# Patient Record
Sex: FEMALE | Race: WHITE | NOT HISPANIC OR LATINO | Employment: FULL TIME | URBAN - METROPOLITAN AREA
[De-identification: names, ages, dates, MRNs, and addresses within clinical notes are randomized per-mention and may not be internally consistent; named-entity substitution may affect disease eponyms.]

---

## 2019-12-19 ENCOUNTER — COMMUNICATION - HEALTHEAST (OUTPATIENT)
Dept: FAMILY MEDICINE | Facility: CLINIC | Age: 34
End: 2019-12-19

## 2021-01-06 ENCOUNTER — OFFICE VISIT - HEALTHEAST (OUTPATIENT)
Dept: FAMILY MEDICINE | Facility: CLINIC | Age: 36
End: 2021-01-06

## 2021-01-06 DIAGNOSIS — Z23 NEED FOR IMMUNIZATION AGAINST INFLUENZA: ICD-10-CM

## 2021-01-06 DIAGNOSIS — Z23 NEED FOR TETANUS BOOSTER: ICD-10-CM

## 2021-01-06 DIAGNOSIS — Z87.42 H/O ABNORMAL CERVICAL PAPANICOLAOU SMEAR: ICD-10-CM

## 2021-01-06 DIAGNOSIS — K90.41 GLUTEN INTOLERANCE: ICD-10-CM

## 2021-01-06 DIAGNOSIS — Z11.4 SCREENING FOR HIV (HUMAN IMMUNODEFICIENCY VIRUS): ICD-10-CM

## 2021-01-06 DIAGNOSIS — Z11.59 ENCOUNTER FOR HEPATITIS C SCREENING TEST FOR LOW RISK PATIENT: ICD-10-CM

## 2021-01-06 DIAGNOSIS — F41.1 GENERALIZED ANXIETY DISORDER: ICD-10-CM

## 2021-01-06 DIAGNOSIS — Z11.59 NEED FOR HEPATITIS B SCREENING TEST: ICD-10-CM

## 2021-01-06 DIAGNOSIS — Z00.00 ROUTINE GENERAL MEDICAL EXAMINATION AT A HEALTH CARE FACILITY: ICD-10-CM

## 2021-01-06 LAB
ALBUMIN SERPL-MCNC: 4.3 G/DL (ref 3.5–5)
ALBUMIN UR-MCNC: NEGATIVE MG/DL
ALP SERPL-CCNC: 51 U/L (ref 45–120)
ALT SERPL W P-5'-P-CCNC: 10 U/L (ref 0–45)
ANION GAP SERPL CALCULATED.3IONS-SCNC: 11 MMOL/L (ref 5–18)
APPEARANCE UR: CLEAR
AST SERPL W P-5'-P-CCNC: 16 U/L (ref 0–40)
BACTERIA #/AREA URNS HPF: ABNORMAL HPF
BILIRUB SERPL-MCNC: 0.5 MG/DL (ref 0–1)
BILIRUB UR QL STRIP: NEGATIVE
BUN SERPL-MCNC: 9 MG/DL (ref 8–22)
CALCIUM SERPL-MCNC: 9.3 MG/DL (ref 8.5–10.5)
CHLORIDE BLD-SCNC: 105 MMOL/L (ref 98–107)
CHOLEST SERPL-MCNC: 199 MG/DL
CLUE CELLS: NORMAL
CO2 SERPL-SCNC: 23 MMOL/L (ref 22–31)
COLOR UR AUTO: YELLOW
CREAT SERPL-MCNC: 0.84 MG/DL (ref 0.6–1.1)
ERYTHROCYTE [DISTWIDTH] IN BLOOD BY AUTOMATED COUNT: 11.1 % (ref 11–14.5)
FASTING STATUS PATIENT QL REPORTED: YES
GFR SERPL CREATININE-BSD FRML MDRD: >60 ML/MIN/1.73M2
GLUCOSE BLD-MCNC: 83 MG/DL (ref 70–125)
GLUCOSE UR STRIP-MCNC: NEGATIVE MG/DL
HCT VFR BLD AUTO: 42.8 % (ref 35–47)
HDLC SERPL-MCNC: 79 MG/DL
HGB BLD-MCNC: 14 G/DL (ref 12–16)
HGB UR QL STRIP: ABNORMAL
HIV 1+2 AB+HIV1 P24 AG SERPL QL IA: NEGATIVE
KETONES UR STRIP-MCNC: NEGATIVE MG/DL
LDLC SERPL CALC-MCNC: 110 MG/DL
LEUKOCYTE ESTERASE UR QL STRIP: NEGATIVE
MCH RBC QN AUTO: 30.2 PG (ref 27–34)
MCHC RBC AUTO-ENTMCNC: 32.7 G/DL (ref 32–36)
MCV RBC AUTO: 92 FL (ref 80–100)
NITRATE UR QL: NEGATIVE
PH UR STRIP: 6 [PH] (ref 5–8)
PLATELET # BLD AUTO: 216 THOU/UL (ref 140–440)
PMV BLD AUTO: 8.1 FL (ref 7–10)
POTASSIUM BLD-SCNC: 4.7 MMOL/L (ref 3.5–5)
PROT SERPL-MCNC: 7.3 G/DL (ref 6–8)
RBC # BLD AUTO: 4.63 MILL/UL (ref 3.8–5.4)
RBC #/AREA URNS AUTO: ABNORMAL HPF
SODIUM SERPL-SCNC: 139 MMOL/L (ref 136–145)
SP GR UR STRIP: 1.02 (ref 1–1.03)
SQUAMOUS #/AREA URNS AUTO: ABNORMAL LPF
TRICHOMONAS, WET PREP: NORMAL
TRIGL SERPL-MCNC: 48 MG/DL
UROBILINOGEN UR STRIP-ACNC: ABNORMAL
WBC #/AREA URNS AUTO: ABNORMAL HPF
WBC: 4.8 THOU/UL (ref 4–11)
YEAST, WET PREP: NORMAL

## 2021-01-06 ASSESSMENT — MIFFLIN-ST. JEOR: SCORE: 1318.19

## 2021-01-07 LAB
HBV SURFACE AB SERPL IA-ACNC: POSITIVE M[IU]/ML
HCV AB SERPL QL IA: NEGATIVE
HPV SOURCE: NORMAL
HUMAN PAPILLOMA VIRUS 16 DNA: NEGATIVE
HUMAN PAPILLOMA VIRUS 18 DNA: NEGATIVE
HUMAN PAPILLOMA VIRUS FINAL DIAGNOSIS: NORMAL
HUMAN PAPILLOMA VIRUS OTHER HR: NEGATIVE
SPECIMEN DESCRIPTION: NORMAL

## 2021-01-08 ENCOUNTER — COMMUNICATION - HEALTHEAST (OUTPATIENT)
Dept: FAMILY MEDICINE | Facility: CLINIC | Age: 36
End: 2021-01-08

## 2021-01-13 ENCOUNTER — OFFICE VISIT - HEALTHEAST (OUTPATIENT)
Dept: PHYSICAL THERAPY | Facility: REHABILITATION | Age: 36
End: 2021-01-13

## 2021-01-13 DIAGNOSIS — M43.6 NECK STIFFNESS: ICD-10-CM

## 2021-01-13 DIAGNOSIS — G89.29 CHRONIC BILATERAL THORACIC BACK PAIN: ICD-10-CM

## 2021-01-13 DIAGNOSIS — M26.623 BILATERAL TEMPOROMANDIBULAR JOINT PAIN-DYSFUNCTION SYNDROME: ICD-10-CM

## 2021-01-13 DIAGNOSIS — M54.2 CHRONIC NECK PAIN: ICD-10-CM

## 2021-01-13 DIAGNOSIS — M54.6 CHRONIC BILATERAL THORACIC BACK PAIN: ICD-10-CM

## 2021-01-13 DIAGNOSIS — G89.29 CHRONIC NECK PAIN: ICD-10-CM

## 2021-01-16 ENCOUNTER — RECORDS - HEALTHEAST (OUTPATIENT)
Dept: ADMINISTRATIVE | Facility: OTHER | Age: 36
End: 2021-01-16

## 2021-01-18 ENCOUNTER — COMMUNICATION - HEALTHEAST (OUTPATIENT)
Dept: FAMILY MEDICINE | Facility: CLINIC | Age: 36
End: 2021-01-18

## 2021-01-18 DIAGNOSIS — J02.0 STREP PHARYNGITIS: ICD-10-CM

## 2021-01-27 ENCOUNTER — OFFICE VISIT - HEALTHEAST (OUTPATIENT)
Dept: PHYSICAL THERAPY | Facility: REHABILITATION | Age: 36
End: 2021-01-27

## 2021-01-27 DIAGNOSIS — M43.6 NECK STIFFNESS: ICD-10-CM

## 2021-01-27 DIAGNOSIS — G89.29 CHRONIC NECK PAIN: ICD-10-CM

## 2021-01-27 DIAGNOSIS — G89.29 CHRONIC BILATERAL THORACIC BACK PAIN: ICD-10-CM

## 2021-01-27 DIAGNOSIS — M54.2 CHRONIC NECK PAIN: ICD-10-CM

## 2021-01-27 DIAGNOSIS — M54.6 CHRONIC BILATERAL THORACIC BACK PAIN: ICD-10-CM

## 2021-01-27 DIAGNOSIS — M26.623 BILATERAL TEMPOROMANDIBULAR JOINT PAIN-DYSFUNCTION SYNDROME: ICD-10-CM

## 2021-02-02 ENCOUNTER — COMMUNICATION - HEALTHEAST (OUTPATIENT)
Dept: FAMILY MEDICINE | Facility: CLINIC | Age: 36
End: 2021-02-02

## 2021-02-03 ENCOUNTER — OFFICE VISIT - HEALTHEAST (OUTPATIENT)
Dept: PHYSICAL THERAPY | Facility: REHABILITATION | Age: 36
End: 2021-02-03

## 2021-02-03 DIAGNOSIS — M54.2 CHRONIC NECK PAIN: ICD-10-CM

## 2021-02-03 DIAGNOSIS — M43.6 NECK STIFFNESS: ICD-10-CM

## 2021-02-03 DIAGNOSIS — G89.29 CHRONIC BILATERAL THORACIC BACK PAIN: ICD-10-CM

## 2021-02-03 DIAGNOSIS — M26.623 BILATERAL TEMPOROMANDIBULAR JOINT PAIN-DYSFUNCTION SYNDROME: ICD-10-CM

## 2021-02-03 DIAGNOSIS — G89.29 CHRONIC NECK PAIN: ICD-10-CM

## 2021-02-03 DIAGNOSIS — M54.6 CHRONIC BILATERAL THORACIC BACK PAIN: ICD-10-CM

## 2021-02-08 ENCOUNTER — OFFICE VISIT - HEALTHEAST (OUTPATIENT)
Dept: PHYSICAL THERAPY | Facility: REHABILITATION | Age: 36
End: 2021-02-08

## 2021-02-08 DIAGNOSIS — M54.2 CHRONIC NECK PAIN: ICD-10-CM

## 2021-02-08 DIAGNOSIS — G89.29 CHRONIC NECK PAIN: ICD-10-CM

## 2021-02-08 DIAGNOSIS — M43.6 NECK STIFFNESS: ICD-10-CM

## 2021-02-08 DIAGNOSIS — G89.29 CHRONIC BILATERAL THORACIC BACK PAIN: ICD-10-CM

## 2021-02-08 DIAGNOSIS — M54.6 CHRONIC BILATERAL THORACIC BACK PAIN: ICD-10-CM

## 2021-02-08 DIAGNOSIS — M26.623 BILATERAL TEMPOROMANDIBULAR JOINT PAIN-DYSFUNCTION SYNDROME: ICD-10-CM

## 2021-02-16 ENCOUNTER — OFFICE VISIT - HEALTHEAST (OUTPATIENT)
Dept: PHYSICAL THERAPY | Facility: REHABILITATION | Age: 36
End: 2021-02-16

## 2021-02-16 DIAGNOSIS — M26.623 BILATERAL TEMPOROMANDIBULAR JOINT PAIN-DYSFUNCTION SYNDROME: ICD-10-CM

## 2021-02-16 DIAGNOSIS — M43.6 NECK STIFFNESS: ICD-10-CM

## 2021-02-16 DIAGNOSIS — M54.2 CHRONIC NECK PAIN: ICD-10-CM

## 2021-02-16 DIAGNOSIS — G89.29 CHRONIC BILATERAL THORACIC BACK PAIN: ICD-10-CM

## 2021-02-16 DIAGNOSIS — M54.6 CHRONIC BILATERAL THORACIC BACK PAIN: ICD-10-CM

## 2021-02-16 DIAGNOSIS — G89.29 CHRONIC NECK PAIN: ICD-10-CM

## 2021-04-13 ENCOUNTER — OFFICE VISIT - HEALTHEAST (OUTPATIENT)
Dept: PHYSICAL THERAPY | Facility: REHABILITATION | Age: 36
End: 2021-04-13

## 2021-04-13 DIAGNOSIS — M43.6 NECK STIFFNESS: ICD-10-CM

## 2021-04-13 DIAGNOSIS — M26.623 BILATERAL TEMPOROMANDIBULAR JOINT PAIN-DYSFUNCTION SYNDROME: ICD-10-CM

## 2021-04-13 DIAGNOSIS — M54.6 CHRONIC BILATERAL THORACIC BACK PAIN: ICD-10-CM

## 2021-04-13 DIAGNOSIS — M54.2 CHRONIC NECK PAIN: ICD-10-CM

## 2021-04-13 DIAGNOSIS — G89.29 CHRONIC NECK PAIN: ICD-10-CM

## 2021-04-13 DIAGNOSIS — G89.29 CHRONIC BILATERAL THORACIC BACK PAIN: ICD-10-CM

## 2021-04-20 ENCOUNTER — OFFICE VISIT - HEALTHEAST (OUTPATIENT)
Dept: PHYSICAL THERAPY | Facility: REHABILITATION | Age: 36
End: 2021-04-20

## 2021-04-20 DIAGNOSIS — M26.623 BILATERAL TEMPOROMANDIBULAR JOINT PAIN-DYSFUNCTION SYNDROME: ICD-10-CM

## 2021-04-20 DIAGNOSIS — M54.2 CHRONIC NECK PAIN: ICD-10-CM

## 2021-04-20 DIAGNOSIS — G89.29 CHRONIC NECK PAIN: ICD-10-CM

## 2021-04-20 DIAGNOSIS — G89.29 CHRONIC BILATERAL THORACIC BACK PAIN: ICD-10-CM

## 2021-04-20 DIAGNOSIS — M54.6 CHRONIC BILATERAL THORACIC BACK PAIN: ICD-10-CM

## 2021-04-20 DIAGNOSIS — M43.6 NECK STIFFNESS: ICD-10-CM

## 2021-05-04 ENCOUNTER — OFFICE VISIT - HEALTHEAST (OUTPATIENT)
Dept: PHYSICAL THERAPY | Facility: REHABILITATION | Age: 36
End: 2021-05-04

## 2021-05-04 DIAGNOSIS — M43.6 NECK STIFFNESS: ICD-10-CM

## 2021-05-04 DIAGNOSIS — G89.29 CHRONIC NECK PAIN: ICD-10-CM

## 2021-05-04 DIAGNOSIS — M26.623 BILATERAL TEMPOROMANDIBULAR JOINT PAIN-DYSFUNCTION SYNDROME: ICD-10-CM

## 2021-05-04 DIAGNOSIS — G89.29 CHRONIC BILATERAL THORACIC BACK PAIN: ICD-10-CM

## 2021-05-04 DIAGNOSIS — M54.2 CHRONIC NECK PAIN: ICD-10-CM

## 2021-05-04 DIAGNOSIS — M54.6 CHRONIC BILATERAL THORACIC BACK PAIN: ICD-10-CM

## 2021-05-19 ENCOUNTER — OFFICE VISIT - HEALTHEAST (OUTPATIENT)
Dept: PHYSICAL THERAPY | Facility: REHABILITATION | Age: 36
End: 2021-05-19

## 2021-05-19 DIAGNOSIS — M54.6 CHRONIC BILATERAL THORACIC BACK PAIN: ICD-10-CM

## 2021-05-19 DIAGNOSIS — G89.29 CHRONIC NECK PAIN: ICD-10-CM

## 2021-05-19 DIAGNOSIS — M26.623 BILATERAL TEMPOROMANDIBULAR JOINT PAIN-DYSFUNCTION SYNDROME: ICD-10-CM

## 2021-05-19 DIAGNOSIS — G89.29 CHRONIC BILATERAL THORACIC BACK PAIN: ICD-10-CM

## 2021-05-19 DIAGNOSIS — M43.6 NECK STIFFNESS: ICD-10-CM

## 2021-05-19 DIAGNOSIS — M54.2 CHRONIC NECK PAIN: ICD-10-CM

## 2021-06-04 NOTE — TELEPHONE ENCOUNTER
Who is calling:  Patient, Irina Marino   Reason for Call:  Has until Monday to make decision on healthcare and very interested in having Dr. Morley as PCP based on [facility] experience, would like to be contacted is Dr. Morley would be willing to take her on as new patient   Date of last appointment with primary care:   Okay to leave a detailed message: Yes

## 2021-06-05 VITALS
BODY MASS INDEX: 20.99 KG/M2 | HEART RATE: 86 BPM | DIASTOLIC BLOOD PRESSURE: 66 MMHG | TEMPERATURE: 98.7 F | HEIGHT: 67 IN | SYSTOLIC BLOOD PRESSURE: 100 MMHG | OXYGEN SATURATION: 99 % | WEIGHT: 133.75 LBS | RESPIRATION RATE: 18 BRPM

## 2021-06-14 NOTE — TELEPHONE ENCOUNTER
Based [facility], I recommend empiric treatment of strep pharyngitis. Her other option would be to go to another urgent care to have a confirmation done.       Based on her symptoms, she meets 3/4 centor criteria (tonsilar exudates, fever, no cough).       Please call patient to let her know her options.   I can send in amoxicillin 500mg two times a day for 10 days. Per chart review, she does not have any allergies to penicillins.     Maddy Sevilla MD

## 2021-06-14 NOTE — PROGRESS NOTES
[facility] Daily Progress     Patient Name: Irina Marino  Date: 1/27/2021  Visit #: 2/6-12  PTA visit #:    Referral Diagnosis:  Bilateral TMJ arthralgia, masticatory and cervical myofascial pain  Referring provider: Deborah Pope DDS, MS  Visit Diagnosis:     ICD-10-CM    1. Bilateral temporomandibular joint pain-dysfunction syndrome  M26.623    2. Chronic neck pain  M54.2     G89.29    3. Neck stiffness  M43.6    4. Chronic bilateral thoracic back pain  M54.6     G89.29      Assessment:   From Evaluation: Pt presents to PT with chronic bilateral TMJ, neck, upper back/shoulder pain with mobility deficits.  MT and HEP initiated today, and patient with good tolerance for all. Plan to continue to address cervical/thoracic hypomobility and myofascial restrictions to help reduce pain and improve QOL.     Patient demonstrates understanding/independence with home program.  Patient is benefitting from skilled physical therapy and is making steady progress toward functional goals.  Patient is appropriate to continue with skilled physical therapy intervention, as indicated by initial plan of care.    Goal Status:  Pt. will be independent with home exercise program in : 6 weeks;Comment  Comment:: for pain <= 1/10  Pt. will decrease use of medication for pain for improved quality of life in : 6 weeks    No data recorded    Plan / Patient Education:     Continue with initial plan of care.  Progress with home program as tolerated.     Plan for next session: Review HEP for correct performance. Continue MT to neck/upper back/TMJ to address spinal stiffness and myofascial restriction impairments.    Subjective:    TMJ heating pad came but the gel packs are not working well -has to come up with something else. Mouth guard: going tomorrow for impressions.   Since last session - pt had strep throat.  Has not been as compliant with HEP due to illness.   Pain Rating: not painful - more tight    Dull pain  tightness in TMJ, neck and upper trap.     Objective:   Exercises:  Exercise #1: Thoracic mobilty ex: foam rolling vertically x1 min, segmental extension x20 reps  Comment #1: Prone Is: 10x3 sec  Exercise #2: Chin tuck: 15x3 sec supine  Comment #2: L/R scalene stretch: 2x20-30 sec B  Discussed Prone I's - pt can perform rows instead with dumbbells on days she is lifting weights   Discussed chin tucks as a way to improve posture with sitting at computer  Discussed posture at computer - pt has added a lumbar roll.     Treatment Today     TREATMENT MINUTES COMMENTS   Evaluation     Self-care/ Home management     Manual therapy 23 Supine:   CST TMJ release.   MFR to masseter  Occipital release   Dural tube traction - cervical   UT STM     Neuromuscular Re-education     Therapeutic Activity     Therapeutic Exercises 3 See above    Gait training     Modality__________________                Total 26    Blank areas are intentional and mean the treatment did not include these items.       Ingrid Curtis, PT, DPT  1/27/2021

## 2021-06-14 NOTE — TELEPHONE ENCOUNTER
Copy of [facility] medical record received. Copied to medical record scanning and copy given to Dr. Sevilla who is covering for Dr. Danielson today.

## 2021-06-14 NOTE — TELEPHONE ENCOUNTER
"Reason for call:  Patient reporting a symptom    Symptom or request: Mild light-headedness, strange sensation in hands    Duration (how long have symptoms been present): Started this morning     Have you been treated for this before? N/A-follow up question, poss SE of Amoxicillin?     Additional comments: The patient contacts the office today follow up on what she things might be a reaction to the Amoxicillin prescribed by Dr. Sevilla 1/18/2021.     The patient states that she took her first med dose yesterday 1/18/2021 and second dose this morning. She took the medicine with food (toast and 2 eggs, water and 2 cups herbal tea). Patient states she was sitting at desk at home typing and became slightly light-headed and developed a strange sensation in her hands \"it's like I can't exert as much force on a key with finger, like I have decreased control of my hands.\" Patient endorses lessening of the above now but still present to some degree.     No other s/s, denies N/V.     Patient wants to know MD opinion and okay for patient to continue taking?     Phone Number patient can be reached at:  Cell number on file:    Telephone Information:   Mobile [phone]       Best Time:  ASAP     Can we leave a detailed message on this number: Yes    Call taken on 1/19/2021 at 12:40 PM by Robbie Henson    "

## 2021-06-14 NOTE — PROGRESS NOTES
[facility]   Initial Evaluation    Patient Name: Irina Marino  Date of evaluation: 1/13/2021  PRECAUTIONS: none  Referral Diagnosis: Bilateral TMJ arthralgia, masticatory and cervical myofascial pain  Referring provider: Deborah Pope DDS, MS  Visit Diagnosis:     ICD-10-CM    1. Bilateral temporomandibular joint pain-dysfunction syndrome  M26.623    2. Chronic neck pain  M54.2     G89.29    3. Neck stiffness  M43.6    4. Chronic bilateral thoracic back pain  M54.6     G89.29        Assessment:      Pt. is appropriate for skilled PT intervention as outlined in the Plan of Care (POC).  Pt. is a good candidate for skilled PT services to improve pain levels and function.  Goals and POC established in collaboration with the patient.  Pt presents to PT with chronic bilateral TMJ, neck, upper back/shoulder pain with mobility deficits.  MT and HEP initiated today, and patient with good tolerance for all. Plan to continue to address cervical/thoracic hypomobility and myofascial restrictions to help reduce pain and improve QOL.    Goals:  Pt. will be independent with home exercise program in : 6 weeks;Comment  Comment:: for pain <= 1/10  Pt. will decrease use of medication for pain for improved quality of life in : 6 weeks    Patient's expectations/goals are realistic.    Barriers to Learning or Achieving Goals:  Chronicity of the problem.       Plan / Patient Instructions:        Plan of Care:   Communication with: Referral Source;Patient Caregiver  Patient Related Instruction: Nature of Condition;Treatment plan and rationale;Self Care instruction;Basis of treatment;Posture;Precautions;Next steps;Expected outcome  Times per Week: 1  Number of Weeks: 6-12  Number of Visits: 6-12  Therapeutic Exercise: Stretching;Strengthening  Neuromuscular Reeducation: posture  Manual Therapy: soft tissue mobilization;myofascial release;joint mobilization;craniosacral therapy;muscle  "energy      Plan for next visit: Review HEP for correct performance. Continue MT to neck/upper back/TMJ to address spinal stiffness and myofascial restriction impairments.     Subjective:       History of Present Illness:    Irina is a 35 y.o. female who presents to therapy today with complaints of chronic bilateral TMJ, neck, shoulders, and upper back pain.   Onset: Chronic for many years.   [facility] Currently has a night mouth guard, but will be getting a new one made to help with the clenching at nighttime, in addition to referral for PT, as well as buying a TMJ heating pad off [employer].  Occcasionally will note aching into the ears.  Often finds herself clenching into forward flexed position t/o her upper quarter.  Does get tension HAs, described as frequent.  While does have h/o anxiety and previous medication use, is not currently on any medications. Working to manage through various techniques including exercise (Peloton 5 days/week and yoga), diet, relaxing baths while listening to a podcast.  Duration: Constant  Pain Medication: Denies pain medication use.  Sleep: Reports waking 2x/night on average due to pain.    Denies previous neck surgeries.  [place] teeth have been removed.  Notes she did have braces a couple of times growing up, as she did move several times.    Pt seeks PT to \"help with pain, tightness, clenching due to TMJ pain in shoulders and neck.\"    Pain Rating: Did not rate     Objective:      Patient Outcome Measures :    None completed    Examination  1. Bilateral temporomandibular joint pain-dysfunction syndrome     2. Chronic neck pain     3. Neck stiffness     4. Chronic bilateral thoracic back pain       Involved side: Bilateral  Posture Observation:      General sitting posture is  normal.  General standing posture is normal.    Cervical ROM  Date:      *Indicate scale AROM AROM AROM   Cervical Flexion 50 with ERP in mid back     Cervical Extension 45      " Right Left Right Left Right Left   Cervical Sidebending 30 with pull on L 30 with pull on R       Cervical Rotation 65 with pull on R 70 with pull on R       Cervical Protraction      Cervical Retraction      Thoracic Flexion      Thoracic Extension      Thoracic Sidebending         Thoracic Rotation           Bilat shoulder AROM WFL t/o.  Strength  Date:      Cervical Myotomes/5 Right Left Right Left Right Left   Cervical Flexion (C1-2)         Cervical Sidebending (C3)         Shoulder Elevation (C4)         Shoulder Abduction (C5)         Elbow Flexion (C6)         Elbow Extension (C7)         Wrist Flexion (C7)         Wrist Extension (C6)         Thumb abduction (C8)         Finger Abduction (T1)           MMT rhomboids 4+/5 bilat  MMT middle trap 4/5 bilat  MMT lower trap 4-/5 bilat    TMJ ROM  Motion(normal values) mm Pain Comment (noise/ deviation)   Active Incisal opening (40-60 mm)   WNL AROM, but notes mild end-range discomfort bilat. Slight R lateral deviation, incoordination noted on opening   Right laterotrusion (7-12mm)   WNL   Left laterotrusion(7-12 mm)   WNL   Protrusion (8-12mm)   WNL     Palpation and PIVM: Increased muscle spasm noted bilat cervical paraspinals, scalenes, masseter, suboccipitals. Hypomobility present with central PAs to cervical and upper/mid thoracic spine (none further assessed).    Treatment Today     TREATMENT MINUTES COMMENTS   Evaluation 30 Discussed therapy diagnosis, prognosis, POC, relevant anatomy and basis for tx.   Self-care/ Home management     Manual therapy 10 Prone central PAs grade II to C2-C7 and T1-T5  Supine SOR, STM to bilat masseter and scalenes   Neuromuscular Re-education     Therapeutic Activity     Therapeutic Exercises 25 Exercises:  Exercise #1: Thoracic mobilty ex: foam rolling vertically x1 min, segmental extension x20 reps  Comment #1: Prone Is: 10x3 sec  Exercise #2: Chin tuck: 15x3 sec supine  Comment #2: L/R scalene stretch: 2x20-30 sec B      Gait training     Modality__________________                Total 65    Blank areas are intentional and mean the treatment did not include these items.            PT Evaluation Code: (Please list factors)  Patient History/Comorbidities: chronicity  Examination: B TMJ/neck/upper back pain  Clinical Presentation: Stable  Clinical Decision Making: Low    Patient History/  Comorbidities Examination  (body structures and functions, activity limitations, and/or participation restrictions) Clinical Presentation Clinical Decision Making (Complexity)   No documented Comorbidities or personal factors 1-2 Elements Stable and/or uncomplicated Low   1-2 documented comorbidities or personal factor 3 Elements Evolving clinical presentation with changing characteristics Moderate   3-4 documented comorbidities or personal factors 4 or more Unstable and unpredictable High Abhinav Mckeon  1/13/2021  4:26 PM

## 2021-06-14 NOTE — TELEPHONE ENCOUNTER
Writer called and left detailed message for patient per her request. If she calls back, please advise her per MD note below.

## 2021-06-14 NOTE — TELEPHONE ENCOUNTER
Only other option would be a penicillin injection.     Medication sent.   If patient prefers injection, we could do a nurse only visit.   She would be considered no longer contagious 24 hours after she starts treatment.     Maddy Sevilla MD

## 2021-06-14 NOTE — TELEPHONE ENCOUNTER
"Reason for call:  Patient reporting a symptom    Symptom or request: The patient states that she developed sore throat with white spots, pinpoint red spots on soft palate, 99 F temp, body aches, pounding headache, and fatigue on 1/14/2021. The patient states that her sore feels \"full\" and it hurts to swallow. Patient was evaluated @ [facility] in [facility], provider completed rapid strep test but no confirmatory test.     Patient had a COVID test completed at [facility] in [place] when she found out her rapid strep was negative. COVID test negative/normal. Patient denies rash, shortness of breath, loss of smell/taste. Patient has been using ES Tylenol for symptoms with minimal relief.    Duration (how long have symptoms been present): >5 days, started 01/14/2021    Have you been treated for this before? Yes    Additional comments: The patient states that she needs to know what her next steps are in light of the fact that the [facility] provider did not do a confirmatory test?     Symptoms remain unresolved.     Phone Number patient can be reached at:  Cell number on file:    Telephone Information:   Mobile [phone]       Best Time:  ASAP     Can we leave a detailed message on this number: Yes    Call taken on 1/18/2021 at 2:20 PM by Robbie Henson    "

## 2021-06-14 NOTE — TELEPHONE ENCOUNTER
Patient notified. The patient verbalizes understanding of provider/CSS instructions for follow-up and continued care per provider message.     No allergies to PCN. The patient is wondering if there is a formulation that one can take that does not take 10 days?     [facility], typically collects medications at [facility] but not today.

## 2021-06-14 NOTE — TELEPHONE ENCOUNTER
Does not sound like allergic reaction.   I think it would be safe to continue on amoxicillin.     However, if symptoms continue to get worse we can change to azithromycin.     Maddy Sevilla MD

## 2021-06-15 NOTE — PROGRESS NOTES
[facility] Daily Progress     Patient Name: Irina Marino  Date: 2/16/2021  Visit #: 5/6-12  PTA visit #:    Referral Diagnosis:  Bilateral TMJ arthralgia, masticatory and cervical myofascial pain  Referring provider: Deborah Pope DDS, MS  Visit Diagnosis:     ICD-10-CM    1. Bilateral temporomandibular joint pain-dysfunction syndrome  M26.623    2. Chronic neck pain  M54.2     G89.29    3. Neck stiffness  M43.6    4. Chronic bilateral thoracic back pain  M54.6     G89.29      Assessment:     Pt with excellent understanding of HEP/self-care techniques, working to incorporate throughout the day for optimal sx relief.  She is still waking up clenching her jaw, but demonstrating decreased tightness and improved cervical AROM from start of care.  Reports good relief of MT.     HEP/POC compliance is  good .  Patient demonstrates understanding/independence with home program.  Patient is benefitting from skilled physical therapy and is making steady progress toward functional goals.  Patient is appropriate to continue with skilled physical therapy intervention, as indicated by initial plan of care.    Goal Status: PROGRESSING  Pt. will be independent with home exercise program in : 6 weeks;Comment  Comment:: for pain <= 1/10  Pt. will decrease use of medication for pain for improved quality of life in : 6 weeks    Plan / Patient Education:     Continue MT to neck/upper back/TMJ to address spinal stiffness and myofascial restriction impairments.  Review and update HEP PRN.    Subjective:     Pain Rating: Not so much pain, just stiffness.  Reports MT last visit was helpful; definitely left feeling less tight/stiff. Unsure how long it lasted, because she has been very busy with work, and as a result, noticing increased tightness/tension/stiffness.    Objective:     Increased spasm noted cervical paraspinals, suboccipitals, and bilat masseter; improved with MT.  Cervical ROM           Date:   Eval 2/3/20      *Indicate scale AROM AROM AROM   Cervical Flexion 50 with ERP in mid back  55     Cervical Extension 45  45       Right Left Right Left Right Left   Cervical Sidebending 30 with pull on L 30 with pull on R 35 with pull on L 35  with pull on R       Cervical Rotation 65 with pull on R 70 with pull on R  80 with pull on L 75 with pull on R           Exercises:  Exercise #1: Thoracic mobilty ex: foam rolling vertically x1 min, segmental extension x10 reps  Comment #1: Prone Is: verbal review. Pt demonstrated bent over rows in addition (using 5-10 lb weights at home)  Exercise #2: Chin tuck: 15x3 sec seated (doing supine as well)  Comment #2: L/R scalene stretch: 2x20-30 sec B  Exercise #3: SCM self-massage  Comment #3: TA: bicycles with and without UE - focus without neck engagement     Treatment Today     TREATMENT MINUTES COMMENTS   Evaluation     Self-care/ Home management     Manual therapy 23 Supine:   SOR   L and R upper trap stretch 2x30 sec each  MFR to bilat masseter, SCM, and scalenes  Central PAs grade II>III C2-C7   Neuromuscular Re-education     Therapeutic Activity     Therapeutic Exercises  Ex per flow sheet   Gait training     Modality__________________                Total 23    Blank areas are intentional and mean the treatment did not include these items.       Abhinav Mckeon, PT, DPT  2/16/2021

## 2021-06-15 NOTE — PROGRESS NOTES
[facility] Daily Progress     Patient Name: Irina Marino  Date: 2/8/2021  Visit #: 4/6-12  PTA visit #:    Referral Diagnosis:  Bilateral TMJ arthralgia, masticatory and cervical myofascial pain  Referring provider: Deborah Pope DDS, MS  Visit Diagnosis:     ICD-10-CM    1. Bilateral temporomandibular joint pain-dysfunction syndrome  M26.623    2. Chronic neck pain  M54.2     G89.29    3. Neck stiffness  M43.6    4. Chronic bilateral thoracic back pain  M54.6     G89.29      Assessment:   Pt with excellent understanding of HEP/self-care techniques, working to incorporate throughout the day for optimal sx relief.  She is still waking up clenching her jaw.      HEP/POC compliance is  good .  Patient demonstrates understanding/independence with home program.  Patient is benefitting from skilled physical therapy and is making steady progress toward functional goals.  Patient is appropriate to continue with skilled physical therapy intervention, as indicated by initial plan of care.    Goal Status: PROGRESSING  Pt. will be independent with home exercise program in : 6 weeks;Comment  Comment:: for pain <= 1/10  Pt. will decrease use of medication for pain for improved quality of life in : 6 weeks    Plan / Patient Education:     Continue MT to neck/upper back/TMJ to address spinal stiffness and myofascial restriction impairments.  Review and update HEP PRN.    Subjective:   Has been compliant with HEP. Pt had a headache a couple days ago. Notices that she is waking up in the morning - she is either clenching or feeling like her jaw is engaged.     Pain Rating: Not so much pain, just stiffness.    Objective:       Cervical ROM           Date:  Eval 2/3/20      *Indicate scale AROM AROM AROM   Cervical Flexion 50 with ERP in mid back  55     Cervical Extension 45  45       Right Left Right Left Right Left   Cervical Sidebending 30 with pull on L 30 with pull on R 35 with pull on L 35  with  pull on R       Cervical Rotation 65 with pull on R 70 with pull on R  80 with pull on L 75 with pull on R           Exercises:  Exercise #1: Thoracic mobilty ex: foam rolling vertically x1 min, segmental extension x10 reps  Comment #1: Prone Is: verbal review. Pt demonstrated bent over rows in addition (using 5-10 lb weights at home)  Exercise #2: Chin tuck: 15x3 sec seated (doing supine as well)  Comment #2: L/R scalene stretch: 2x20-30 sec B  Exercise #3: SCM self-massage  Comment #3: TA: bicycles with and without UE - focus without neck engagement  *pt was having increased neck pain with abdominal exercises. Gave TA exercises.  Recommended 2-3x a week with planking.      Treatment Today     TREATMENT MINUTES COMMENTS   Evaluation     Self-care/ Home management     Manual therapy 47 Myofascial tightness in posterior and anterior neck.   TP referral with SCM bilaterally, sub occipitals and UT on right.   Supine:   R upper trap stretch and levator stretch 20 seconds each  Zygomatic release - inter oral   STM to UT, SCM   Lateral mobs C2-6   Oscillation x20   Counterstrain: R AC-1-2  L AC-3     Neuromuscular Re-education     Therapeutic Activity     Therapeutic Exercises 8 Ex per flow sheet   Gait training     Modality__________________                Total 55    Blank areas are intentional and mean the treatment did not include these items.       Ingrid Curtis, PT, DPT  2/8/2021

## 2021-06-15 NOTE — PROGRESS NOTES
[facility] Daily Progress     Patient Name: Irina Marino  Date: 2/3/2021  Visit #: 3/6-12  PTA visit #:    Referral Diagnosis:  Bilateral TMJ arthralgia, masticatory and cervical myofascial pain  Referring provider: Deborah Pope DDS, MS  Visit Diagnosis:     ICD-10-CM    1. Bilateral temporomandibular joint pain-dysfunction syndrome  M26.623    2. Chronic neck pain  M54.2     G89.29    3. Neck stiffness  M43.6    4. Chronic bilateral thoracic back pain  M54.6     G89.29      Assessment:     Pt with excellent understanding of HEP/self-care techniques, working to incorporate throughout the day for optimal sx relief.  She is still waking up clenching her jaw, but demonstrating decreased tightness and improved cervical AROM from start of care.     HEP/POC compliance is  good .  Patient demonstrates understanding/independence with home program.  Patient is benefitting from skilled physical therapy and is making steady progress toward functional goals.  Patient is appropriate to continue with skilled physical therapy intervention, as indicated by initial plan of care.    Goal Status: PROGRESSING  Pt. will be independent with home exercise program in : 6 weeks;Comment  Comment:: for pain <= 1/10  Pt. will decrease use of medication for pain for improved quality of life in : 6 weeks    Plan / Patient Education:     Continue MT to neck/upper back/TMJ to address spinal stiffness and myofascial restriction impairments.  Review and update HEP PRN.    Subjective:     Pain Rating: Not so much pain, just stiffness.  Still waking up most mornings clenching, but not really this AM. She noticed instead of her jaw clenching, she was pushing her tongue on the roof of her mouth really hard.  Since getting over strep, has been much better about fitting in the exercises. Makes sure to go through at least one full rep, as well as fitting in randomly throughout the day, plus an end of day 10 minute stretch  (through [employer]).  Had impressions made, so it will be a couple of weeks until she gets the mouthguard for night.    Objective:     Increased spasm noted cervical paraspinals, suboccipitals, and bilat masseter; improved with MT.  Cervical ROM           Date:  Eval 2/3/20      *Indicate scale AROM AROM AROM   Cervical Flexion 50 with ERP in mid back  55     Cervical Extension 45  45       Right Left Right Left Right Left   Cervical Sidebending 30 with pull on L 30 with pull on R 35 with pull on L 35  with pull on R       Cervical Rotation 65 with pull on R 70 with pull on R  80 with pull on L 75 with pull on R           Exercises:  Exercise #1: Thoracic mobilty ex: foam rolling vertically x1 min, segmental extension x10 reps  Comment #1: Prone Is: verbal review. Pt demonstrated bent over rows in addition (using 5-10 lb weights at home)  Exercise #2: Chin tuck: 15x3 sec seated (doing supine as well)  Comment #2: L/R scalene stretch: 2x20-30 sec B     Treatment Today     TREATMENT MINUTES COMMENTS   Evaluation     Self-care/ Home management     Manual therapy 14 Supine:   SOR   L and R upper trap stretch 2x30 sec each  MFR to masseter   Cranial ear pull   Neuromuscular Re-education     Therapeutic Activity     Therapeutic Exercises 24 Ex per flow sheet   Gait training     Modality__________________                Total 38    Blank areas are intentional and mean the treatment did not include these items.       Abhinav Mckeon, PT, DPT  2/3/2021

## 2021-06-16 NOTE — PROGRESS NOTES
[facility] Daily Progress     Patient Name: Irina Marino  Date: 4/13/2021  Visit #: 6/6-12  PTA visit #:    Referral Diagnosis:  Bilateral TMJ arthralgia, masticatory and cervical myofascial pain  Referring provider: Deborah Pope DDS, MS  Visit Diagnosis:     ICD-10-CM    1. Bilateral temporomandibular joint pain-dysfunction syndrome  M26.623    2. Chronic neck pain  M54.2     G89.29    3. Neck stiffness  M43.6    4. Chronic bilateral thoracic back pain  M54.6     G89.29      Assessment:   Pt has not been seen since 2/16/21.   Pt with excellent understanding of HEP/self-care techniques, working to incorporate throughout the day for optimal sx relief.  Encourage pt to continue with self-care including heat, HEP and yoga throughout the week and not just when pain arises.   She is still waking up clenching her jaw. Tightness in neck/jaw/UT returned   Reports good relief of MT.     HEP/POC compliance is  good .  Patient demonstrates understanding/independence with home program.  Patient is benefitting from skilled physical therapy and is making steady progress toward functional goals.  Patient is appropriate to continue with skilled physical therapy intervention, as indicated by initial plan of care.    Goal Status: PROGRESSING  Pt. will be independent with home exercise program in : 6 weeks;Comment  Comment:: for pain <= 1/10  Pt. will decrease use of medication for pain for improved quality of life in : 6 weeks    Plan / Patient Education:     Continue MT to neck/upper back/TMJ to address spinal stiffness and myofascial restriction impairments.  Review and update HEP PRN.      Subjective:   Soreness in lateral neck and upper trap.  L>R   Clenching at night.   Started doing yoga.   Objective:     Cervical ROM           Date:  Eval 2/3/21 4/13/21    *Indicate scale AROM AROM AROM   Cervical Flexion 50 with ERP in mid back  55     Cervical Extension 45  45       Right Left Right Left  Right Left   Cervical Sidebending 30 with pull on L 30 with pull on R 35 with pull on L 35  with pull on R       Cervical Rotation 65 with pull on R 70 with pull on R  80 with pull on L 75 with pull on R  54  50       Exercises:  Exercise #1: Thoracic mobilty ex: foam rolling vertically x1 min, segmental extension x10 reps  Comment #1: Prone Is: verbal review. Pt demonstrated bent over rows in addition (using 5-10 lb weights at home)  Exercise #2: Chin tuck: 15x3 sec seated (doing supine as well)  Comment #2: L/R scalene stretch: 2x20-30 sec B  Exercise #3: SCM self-massage  Comment #3: TA: bicycles with and without UE - focus without neck engagement     Treatment Today     TREATMENT MINUTES COMMENTS   Evaluation     Self-care/ Home management     Manual therapy 38 Supine:   MFR to bilat masseter, SCM with referral into temple bilaterally R>L, UT, and scalenes  Lateral mobs grade III C2-C7 overall general tightness but majority of pain is likely myofascial.   MFR with 30 second holds to scalene and lateral neck in inferior glide   CST: sphenoid release, temporal release, TMJ release, Occipital release    Neuromuscular Re-education     Therapeutic Activity     Therapeutic Exercises  Ex per flow sheet   Gait training     Modality__________________                Total 38    Blank areas are intentional and mean the treatment did not include these items.       Ingrid Curtis, PT, DPT  4/13/2021

## 2021-06-16 NOTE — PROGRESS NOTES
[facility] Daily Progress     Patient Name: Irina Marino  Date: 4/20/2021  Visit #: 7/6-12  PTA visit #:    Referral Diagnosis:  Bilateral TMJ arthralgia, masticatory and cervical myofascial pain  Referring provider: Deborah Pope DDS, MS  Visit Diagnosis:     ICD-10-CM    1. Bilateral temporomandibular joint pain-dysfunction syndrome  M26.623    2. Chronic neck pain  M54.2     G89.29    3. Neck stiffness  M43.6    4. Chronic bilateral thoracic back pain  M54.6     G89.29      Assessment:     Pt with excellent understanding of HEP/self-care techniques, working to incorporate throughout the day for optimal sx relief. Focused on manual therapy today.      HEP/POC compliance is  good .  Patient demonstrates understanding/independence with home program.  Patient is benefitting from skilled physical therapy and is making steady progress toward functional goals.  Patient is appropriate to continue with skilled physical therapy intervention, as indicated by initial plan of care.    Goal Status: PROGRESSING  No data recorded     Goals:  Pt. will be independent with home exercise program in : 6 weeks;Comment  Comment:: for pain <= 1/10  Pt. will decrease use of medication for pain for improved quality of life in : 6 weeks  Plan / Patient Education:     Continue MT to neck/upper back/TMJ to address spinal stiffness and myofascial restriction impairments.  Review and update HEP PRN.  Goals - review   Counterstrain       Subjective:   Doing more yoga.  Stretching more. Still has tension in upper back/neck. L>R    Objective:   Counterstrain:   Scanned PLLs and ALLs   ALL Thoracic ALL T6 on L and T5 on R stack.   No tender points found PLLT9-12   Counterstrain Classic: AC1 on R, AC7 on L, AC4 on L     Manual therapy: gentle traction, MFR to scalane, lateral mobs R>L mild hypomobility/ oscillation x30 C2-6        Treatment Today     TREATMENT MINUTES COMMENTS   Evaluation     Self-care/ Home  management     Manual therapy 39 See above along with     Occipital release   Neuromuscular Re-education     Therapeutic Activity     Therapeutic Exercises  Ex per flow sheet   Gait training     Modality__________________                Total 39    Blank areas are intentional and mean the treatment did not include these items.       Ingrid Curtis, PT, DPT  4/20/2021

## 2021-06-17 NOTE — PROGRESS NOTES
[facility] Daily Progress     Patient Name: Irina Marino  Date: 5/19/2021  Visit #: 9/6-12  PTA visit #:    Referral Diagnosis:  Bilateral TMJ arthralgia, masticatory and cervical myofascial pain  Referring provider: Deborah Pope DDS, MS  Visit Diagnosis:     ICD-10-CM    1. Bilateral temporomandibular joint pain-dysfunction syndrome  M26.623    2. Chronic neck pain  M54.2     G89.29    3. Chronic bilateral thoracic back pain  M54.6     G89.29    4. Neck stiffness  M43.6      Assessment:     Pt with excellent understanding of HEP/self-care techniques, working to incorporate throughout the day for optimal sx relief. Focused on manual therapy today. Pt just purchased a more ergonomic work chair and new massager. Pt will check-in in 3 weeks.      HEP/POC compliance is  good .  Patient demonstrates understanding/independence with home program.  Patient is benefitting from skilled physical therapy and is making steady progress toward functional goals.  Patient is appropriate to continue with skilled physical therapy intervention, as indicated by initial plan of care.    Goal Status: PROGRESSING  No data recorded     Goals:  Pt. will be independent with home exercise program in : 6 weeks;Comment  Comment:: for pain <= 1/10  Pt. will decrease use of medication for pain for improved quality of life in : 6 weeks  Plan / Patient Education:     Continue MT to neck/upper back/TMJ to address spinal stiffness and myofascial restriction impairments.  Review and update HEP PRN.  Goals - review   Counterstrain     Subjective:     Pt got new desk chair which has been helpful.   Since last session pt has been pretty good.  Allergies have been acting up.     Doing more yoga less stationary bike. Shiatsu massager. Foam roller.     New mouth guard - has not been wearing as much - pt feels like it is making the clenching worse. Wore brace last night - and then got a headache the next day.   Pt does have  moderate wear on teeth.   [facility] - has not had a follow-up appointment. Has an appointment in 2 weeks.     Pt has not been as many headaches overall - some allergy headaches.   Yoga/stretching/foam roller is helping.     Pain rating: Was clenching last night.   Pain ratin/10     Objective:   Counterstrain:   Epidural scan   EPIC4 on R and 5 on L   EPIT5 on L     CST: sphenoid release   Frontal bone release    Zygomatic - intra-oral release - tight on L   Occipital release     Treatment Today     TREATMENT MINUTES COMMENTS   Evaluation     Self-care/ Home management     Manual therapy 53 See above   Lateral mobs in cervical spine - oscillation x10   Released post-treatment   Pre -treatment tight on R to L    Neuromuscular Re-education     Therapeutic Activity     Therapeutic Exercises  Ex per flow sheet   Gait training     Modality__________________                Total 53    Blank areas are intentional and mean the treatment did not include these items.       Ingrid Curtis, PT, DPT  2021

## 2021-06-17 NOTE — PROGRESS NOTES
[facility] Daily Progress     Patient Name: Irina Marino  Date: 2021  Visit #:   PTA visit #:    Referral Diagnosis:  Bilateral TMJ arthralgia, masticatory and cervical myofascial pain  Referring provider: Deborah Pope DDS, MS  Visit Diagnosis:     ICD-10-CM    1. Bilateral temporomandibular joint pain-dysfunction syndrome  M26.623    2. Chronic neck pain  M54.2     G89.29    3. Chronic bilateral thoracic back pain  M54.6     G89.29    4. Neck stiffness  M43.6      Assessment:     Pt with excellent understanding of HEP/self-care techniques, working to incorporate throughout the day for optimal sx relief. Focused on manual therapy today. Pt just purchased a more ergonomic work chair and new massager. Pt will check-in in 2 weeks.      HEP/POC compliance is  good .  Patient demonstrates understanding/independence with home program.  Patient is benefitting from skilled physical therapy and is making steady progress toward functional goals.  Patient is appropriate to continue with skilled physical therapy intervention, as indicated by initial plan of care.    Goal Status: PROGRESSING  No data recorded     Goals:  Pt. will be independent with home exercise program in : 6 weeks;Comment  Comment:: for pain <= 1/10  Pt. will decrease use of medication for pain for improved quality of life in : 6 weeks  Plan / Patient Education:     Continue MT to neck/upper back/TMJ to address spinal stiffness and myofascial restriction impairments.  Review and update HEP PRN.  Goals - review   Counterstrain     Subjective:   Doing more yoga less stationary bike. Shiatsu massager. Foam roller.   Pain rating: was feeling more tension last week.   Still has tension in upper back/neck.   Pain ratin/10     Objective:   Counterstrain:   Scanned PLLs and ALLs   L PLLs tight   PLLT3 on L   Epidural scan   EPIC2 on L and 6 on R   AC1 on R   AC3 on L and 2 & 6 on R   Artery scan:   RADT12 and 7 on L    PINT-A 9 on L and 10 on R     Manual therapy: in prone: rib mobs grade II-III L>R, STM to lumbar spinal erectors, thoracic spinal erectors     Treatment Today     TREATMENT MINUTES COMMENTS   Evaluation     Self-care/ Home management     Manual therapy 54 See above along with     Occipital release   Neuromuscular Re-education     Therapeutic Activity     Therapeutic Exercises  Ex per flow sheet   Gait training     Modality__________________                Total 54    Blank areas are intentional and mean the treatment did not include these items.       Ingrid Curtis, PT, DPT  5/4/2021

## 2021-06-18 NOTE — PATIENT INSTRUCTIONS - HE
Patient Instructions by Ingrid Curtis PT at 2/8/2021  2:00 PM     Author: Ingrid Curtis PT Service: -- Author Type: Physical Therapist    Filed: 2/8/2021  2:57 PM Encounter Date: 2/8/2021 Status: Signed    : Ingrid Curtis PT (Physical Therapist)        BRACE - BILATERAL BENT LEG LIFT    While lying on your back with your knees bent,  raise up both feet. Use your stomach muscles to keep your spine from moving.    BRACE - BICYCLE    While lying on your back with your knees bent,  raise up both feet and straighten one out in front of you. Then return the leg back and straighten the other. Use your stomach muscles to keep your spine from moving.    DEAD BUG    While lying on your back with your knees bent, slowly raise up one foot and opposite arm.    Retrun to starting position and then repeat on the opposite side.     Keep your low back flat on the floor the entire time.

## 2021-06-18 NOTE — PATIENT INSTRUCTIONS - HE
"Patient Instructions by Abhinav Mckeon PT at 1/13/2021 11:30 AM     Author: Abhinav Mckeon PT Service: -- Author Type: Physical Therapist    Filed: 1/13/2021 12:33 PM Encounter Date: 1/13/2021 Status: Signed    : Abhinav Mckeon PT (Physical Therapist)            THORACIC HORIZONTAL FOAM ROLLER    Place a foam roller under your upper back while sitting on the floor.  Arch your back over the foam roller, almost like a mini crunch.    Do 15-30x, along various points along the upper back.      Please also make sure to roll up and down along the upper back, x30-60 seconds.    2x/day for both.      Prone Scapular Retraction - I's    Lying face-down with your arms straight near your sides, bring your arms up by squeezing your shoulder blades together. Do not let your shoulders ride up to your ears, by engaging the muscles between your shoulder blades.    Hold 3 seconds. 10x.  1x/day.    *Make sure to have forehead resting on the bed/floor, keeping neck/jaw relaxed.      CHIN TUCK - SUPINE    While lying on your back, tuck your chin towards your chest and press the back of your head into the table.    Maintain contact of head with the surface you are lying on the entire time.    Hold 3 seconds.  15x.  2x/day.      SCALENE STRETCH    Place your hands overlapping on your breast bone. Next, tilt you head upwards and away from the affected side until a gentle stretch is felt along the front and side of your neck.    Hold 15-30 seconds as tolerated. 2x each side.  Can do throughout the day to help relieve neck/shoulder tightness.       Throughout the day, check in on your  \"resting jaw position.\" Front of the tongue should be resting on the roof of your mouth, with teeth apart, mouth closed.                   "

## 2021-06-20 ENCOUNTER — COMMUNICATION - HEALTHEAST (OUTPATIENT)
Dept: SCHEDULING | Facility: CLINIC | Age: 36
End: 2021-06-20

## 2021-06-26 NOTE — TELEPHONE ENCOUNTER
Triage call:    Caller: Patient    She thinks that she may have gotten food poisoning from shell fish.  She was with family on Friday and they had shrimp cocktail.  Saturday morning started feeling like she was having indigestion and headache and had some diarrhea.  She was able to keep down liquids.  As the day went on, she started having pain in lower back/flank area and increasing to sharp pains in abdomen.  Is also having dizziness off and on throughout the day on Saturday.  She only ate a banana late on Saturday.  Still having diarrhea and no appetite, but still forced self to eat a little bit late on Saturday.   Around 0100 this am, woke up feeling very nauseated and having diarrhea.  Then did have an emesis.      All day today she is having more diarrhea, this am it was watery.  She also is having a pounding headache.  She has been able to keep down fluids, gatorade all day.  Still having abdominal pain, nausea increased with any movement and dizziness with any standing up or moving around.  Is needing to hold on to things to keep steady some of the times.  Rates pain moderate for abdominal pain.       Does have a hx of kidney stone.         Temp is 99.0 oral, higher than her baseline of 97.0.     Pt was advised of protocol recommendation/disposition of be seen within 24 hours.  She obtained clinic appointment at [facility] for tomorrow.    Laurence Fitch RN 06/20/21 8:46 PM   [facility] Nurse Advisor      COVID 19 Nurse Triage Plan/Patient Instructions    Please be aware that novel coronavirus (COVID-19) may be circulating in the community. If you develop symptoms such as fever, cough, or SOB or if you have concerns about the presence of another infection including coronavirus (COVID-19), please contact your health care provider or visit [email or web address].     Disposition/Instructions    In-Person Visit with provider recommended. Reference Visit Selection Guide.    Thank you for taking steps to  prevent the spread of this virus.  o Limit your contact with others.  o Wear a simple mask to cover your cough.  o Wash your hands well and often.    Resources    [facility]: About COVID-19: [email or web address]    CDC: What to Do If You're Sick: www.cdc.gov/coronavirus/2019-ncov/about/steps-when-sick.html    CDC: Ending Home Isolation: www.cdc.gov/coronavirus/2019-ncov/hcp/disposition-in-home-patients.html     CDC: Caring for Someone: www.cdc.gov/coronavirus/2019-ncov/if-you-are-sick/care-for-someone.html     [facility]: Interim Guidance for Hospital Discharge to Home: www.[email or web address]/coronavirus/hcp/hospdischarge.pdf    [facility] clinical trials (COVID-19 research studies): [email or web address].[email or web address]-clinical-trials     Below are the COVID-19 hotlines at the [place] [facility] ([facility]). Interpreters are available.   o For health questions: Call 254-748-0933 or 1-994.439.3535 (7 a.m. to 7 p.m.)  o For questions about schools and childcare: Call 813-414-2378 or 1-720.226.2621 (7 a.m. to 7 p.m.)     Reason for Disposition    [1] MODERATE dizziness (e.g., interferes with normal activities) AND [2] has NOT been evaluated by physician for this  (Exception: dizziness caused by heat exposure, sudden standing, or poor fluid intake)    MODERATE pain (e.g., interferes with normal activities or awakens from sleep)    Additional Information    Negative: Passed out (i.e., lost consciousness, collapsed and was not responding)    Negative: Shock suspected (e.g., cold/pale/clammy skin, too weak to stand, low BP, rapid pulse)    Negative: Difficult to awaken or acting confused (e.g., disoriented, slurred speech)    Negative: Sounds like a life-threatening emergency to the triager    Negative: Followed a major injury to the back (e.g., MVA, fall > 10 feet or 3 meters, penetrating injury, etc.)    Negative: Back pain or flank pain during pregnancy    Negative: Upper, mid or lower  back pain that occurs mainly in the midline    Negative: [1] SEVERE pain (e.g., excruciating, scale 8-10) AND [2] present > 1 hour    Negative: [1] SEVERE pain (e.g., excruciating, scale 8-10) AND [2] not improved after pain medicine    Negative: [1] Sudden onset of severe flank pain AND [2] age > 60    Negative: [1] Abdominal pain AND [2] age > 60    Negative: [1] Unable to urinate (or only a few drops) > 4 hours AND     [2] bladder feels very full (e.g., palpable bladder or strong urge to urinate)    Negative: Vomiting    Negative: Weakness of a leg or foot (e.g., unable to bear weight, dragging foot)    Negative: Patient sounds very sick or weak to the triager    Negative: Fever > 100.4 F (38.0 C)    Negative: Pain or burning with passing urine (urination)    Negative: Severe difficulty breathing (e.g., struggling for each breath, speaks in single words)    Negative: [1] Difficulty breathing or swallowing AND [2] started suddenly after medicine, an allergic food or bee sting    Negative: Shock suspected (e.g., cold/pale/clammy skin, too weak to stand, low BP, rapid pulse)    Negative: Difficult to awaken or acting confused (e.g., disoriented, slurred speech)    Negative: [1] Weakness (i.e., paralysis, loss of muscle strength) of the face, arm or leg on one side of the body AND [2] sudden onset AND [3] present now    Negative: [1] Numbness (i.e., loss of sensation) of the face, arm or leg on one side of the body AND [2] sudden onset AND [3] present now    Negative: [1] Loss of speech or garbled speech AND [2] sudden onset AND [3] present now    Negative: Overdose (accidental or intentional) of medications    Negative: [1] Fainted > 15 minutes ago AND [2] still feels too weak or dizzy to stand    Negative: Heart beating < 50 beats per minute OR > 140 beats per minute    Negative: Sounds like a life-threatening emergency to the triager    Negative: Chest pain    Negative: Rectal bleeding, bloody stool, or  "tarry-black stool    Negative: [1] Vomiting AND [2] contains red blood or black (\"coffee ground\") material    Negative: Vomiting is main symptom    Negative: Diarrhea is main symptom    Negative: Headache is main symptom    Negative: Patient states that he/she is having an anxiety/panic attack    Negative: Dizziness from low blood sugar (i.e., < 60 mg/dl or 3.5 mmol/l)    Negative: Dizziness is described as a spinning sensation (i.e., vertigo)    Negative: Heat exhaustion suspected (i.e., dehydration from heat exposure)    Negative: Difficulty breathing    Negative: SEVERE dizziness (e.g., unable to stand, requires support to walk, feels like passing out now)    Negative: Extra heart beats OR irregular heart beating  (i.e., \"palpitations\")    Negative: [1] Drinking very little AND [2] dehydration suspected (e.g., no urine > 12 hours, very dry mouth, very lightheaded)    Negative: Patient sounds very sick or weak to the triager    Negative: [1] Dizziness caused by heat exposure, sudden standing, or poor fluid intake AND [2] no improvement after 2 hours of rest and fluids    Negative: [1] Fever > 103 F (39.4 C) AND [2] not able to get the fever down using Fever Care Advice    Negative: [1] Fever > 101 F (38.3 C) AND [2] age > 60    Negative: [1] Fever > 100.0 F (37.8 C) AND [2] bedridden (e.g., nursing home patient, CVA, chronic illness, recovering from surgery)    Negative: [1] Fever > 100.0 F (37.8 C) AND [2] diabetes mellitus or weak immune system (e.g., HIV positive, cancer chemo, splenectomy, organ transplant, chronic steroids)    Protocols used: DIZZINESS - [id number], FLANK PAIN-A-AH      "

## 2021-06-27 ENCOUNTER — COMMUNICATION - HEALTHEAST (OUTPATIENT)
Dept: SCHEDULING | Facility: CLINIC | Age: 36
End: 2021-06-27

## 2021-06-28 ENCOUNTER — OFFICE VISIT - HEALTHEAST (OUTPATIENT)
Dept: FAMILY MEDICINE | Facility: CLINIC | Age: 36
End: 2021-06-28

## 2021-06-28 DIAGNOSIS — R19.7 DIARRHEA, UNSPECIFIED TYPE: ICD-10-CM

## 2021-06-28 LAB
ALBUMIN SERPL-MCNC: 4.2 G/DL (ref 3.5–5)
ALBUMIN UR-MCNC: NEGATIVE G/DL
ALP SERPL-CCNC: 58 U/L (ref 45–120)
ALT SERPL W P-5'-P-CCNC: 18 U/L (ref 0–45)
ANION GAP SERPL CALCULATED.3IONS-SCNC: 12 MMOL/L (ref 5–18)
APPEARANCE UR: CLEAR
AST SERPL W P-5'-P-CCNC: 22 U/L (ref 0–40)
BILIRUB SERPL-MCNC: 0.6 MG/DL (ref 0–1)
BILIRUB UR QL STRIP: NEGATIVE
BUN SERPL-MCNC: 5 MG/DL (ref 8–22)
CALCIUM SERPL-MCNC: 9.3 MG/DL (ref 8.5–10.5)
CHLORIDE BLD-SCNC: 103 MMOL/L (ref 98–107)
CO2 SERPL-SCNC: 22 MMOL/L (ref 22–31)
COLOR UR AUTO: YELLOW
CREAT SERPL-MCNC: 0.82 MG/DL (ref 0.6–1.1)
ERYTHROCYTE [DISTWIDTH] IN BLOOD BY AUTOMATED COUNT: 12.8 % (ref 11–14.5)
GFR SERPL CREATININE-BSD FRML MDRD: >60 ML/MIN/1.73M2
GLUCOSE BLD-MCNC: 86 MG/DL (ref 70–125)
GLUCOSE UR STRIP-MCNC: NEGATIVE MG/DL
HCT VFR BLD AUTO: 41.3 % (ref 35–47)
HGB BLD-MCNC: 14.2 G/DL (ref 12–16)
HGB UR QL STRIP: NEGATIVE
KETONES UR STRIP-MCNC: NEGATIVE MG/DL
LEUKOCYTE ESTERASE UR QL STRIP: NEGATIVE
MCH RBC QN AUTO: 29.4 PG (ref 27–34)
MCHC RBC AUTO-ENTMCNC: 34.4 G/DL (ref 32–36)
MCV RBC AUTO: 86 FL (ref 80–100)
NITRATE UR QL: NEGATIVE
PH UR STRIP: 5.5 [PH] (ref 5–8)
PLATELET # BLD AUTO: 244 THOU/UL (ref 140–440)
PMV BLD AUTO: 9.4 FL (ref 7–10)
POTASSIUM BLD-SCNC: 4.1 MMOL/L (ref 3.5–5)
PROT SERPL-MCNC: 7.3 G/DL (ref 6–8)
RBC # BLD AUTO: 4.83 MILL/UL (ref 3.8–5.4)
SODIUM SERPL-SCNC: 137 MMOL/L (ref 136–145)
SP GR UR STRIP: 1.01 (ref 1–1.03)
UROBILINOGEN UR STRIP-ACNC: NORMAL
WBC: 6.3 THOU/UL (ref 4–11)

## 2021-06-28 ASSESSMENT — MIFFLIN-ST. JEOR: SCORE: 1285.76

## 2021-06-30 NOTE — PROGRESS NOTES
Progress Notes by Thelma Danielson MD at 1/6/2021  8:00 AM     Author: Thelma Danielson MD Service: -- Author Type: Physician    Filed: 1/27/2021  1:07 PM Encounter Date: 1/6/2021 Status: Addendum    : Thelma Danielson MD (Physician)    Related Notes: Original Note by Thelma Danielson MD (Physician) filed at 1/6/2021  6:02 PM       FEMALE PREVENTATIVE EXAM    Assessment and Plan:   36yo female here for a physcial/establish care visit.    Patient has been advised of split billing requirements and indicates understanding: No    1. Routine general medical examination at a health care facility  Established care today  - HM2(CBC w/o Differential)  - Comprehensive Metabolic Panel  - Urinalysis-UC if Indicated  - Lipid Ada FASTING  - Wet Prep, Vaginal  - Gynecologic Cytology (PAP Smear)    2. Encounter for hepatitis C screening test for low risk patient  Done today  - Hepatitis C Antibody (Anti-HCV)    3. Screening for HIV (human immunodeficiency virus)  Done today  - HIV Antigen/Antibody Screening Ada    4. Need for immunization against influenza  Given today  - Influenza, Seasonal Quad, PF =/> 6months    5. Need for tetanus booster  Given today  - Td, Preservative Free (green label)    6. H/O abnormal cervical Papanicolaou smear  We believe today is pap #3 since her LEEP. Will check history, but if it is, plus she's had HPV series, she can likely resume a normal pap schedule.  - Gynecologic Cytology (PAP Smear)    7. Generalized anxiety disorder  This is significant in her life, and likely a bit worse due to the stressors from Covid19. At this time, she is aware that meds and counseling are recommended, but that she can choose if/when she wants them. I would strongly encourage her to DO something more than she currently is.    8. Gluten intolerance  She believes she has this, and therefore avoids gluten (and has x 10 years). She does not want to pursue more testing at this time. I would  encourage her to seek formal diagnosis of Celiac at some point due to accompanying morbidities.    9. Need for hepatitis B screening test  Drawn with other labs  - Hepatitis B Surface Antibody (Anti-HBs) Vaccine Check    Next follow up:  Return in about 1 year (around 1/6/2022) for Annual physical.  Sooner if she chooses.    Immunization Review  Adult Imm Review: Missing doses of tetanus and flu    I discussed the following with the patient:   Adult Healthy Living: Importance of regular exercise  Healthy nutrition  Getting adequate sleep  Stress management  Use of seat belts               Subjective:   Chief Complaint: Irina Marino is an 35 y.o. female here for a preventative health visit.    HPI:  In her 20s, had a a lot of migraine problems, GERD and digestive issues. Now she thinks this is all gluten intolerance. A nutritionist at [facility] helped her with an elimination diet to find the gluten problem. Other extended family have had this, too.    Some days she has frequent urination. Sometimes she does not completely empty, too. This impacts how she thinks about living life - she might not do something due to this. It's been better recently, since she's working at home.    Work is as a .    Dentist referred her to the [facility]. She grinds her teeth and clenches. This impacts her sleep.    Occasional anxiety in her life. She has been on meds. She did not like being on a med. She works at relaxing and not being competitive.    Last pap 2 years ago.    Healthy Habits  Are you taking a daily aspirin? No  Do you typically exercising at least 40 min, 3-4 times per week?  Yes  Do you usually eat at least 4 servings of fruit and vegetables a day, include whole grains and fiber and avoid regularly eating high fat foods? Yes  Have you had an eye exam in the past two years? Yes  Do you see a dentist twice per year? Yes  Do you have any concerns regarding sleep? YES: [facility] for [facility].     Safety Screen  If you own firearms, are they secured in a locked gun cabinet or with trigger locks? The patient does not own any firearms  Do you feel you are safe where you are living?: Yes (1/6/2021  8:03 AM)  Do you feel you are safe in your relationship(s)?: Yes (1/6/2021  8:03 AM)      Review of Systems:  Please see above.  The rest of the review of systems are negative for all systems.     Pap History:   h/o LGSIL and leep. since then she reports paps have been normal.  Cancer Screening       Status Date      PAP SMEAR Overdue 12/23/2004           Patient Care Team:  Provider, No Primary Care as PCP - General        History     Reviewed By Date/Time Sections Reviewed    Thelma Danielson MD 1/6/2021  9:16 AM Medical, Surgical, Tobacco, Family    Thelma Danielson MD 1/6/2021  8:57 AM Social Documentation    Thelma Danielson MD 1/6/2021  8:52 AM Sexual Activity    Thelma Danielson MD 1/6/2021  8:48 AM Family    Rose Mary Shell MA 1/6/2021  8:04 AM Tobacco, Alcohol, Drug Use            Objective:   Vital Signs:   Visit Vitals  /66   Pulse 86   Temp 98.7  F (37.1  C) (Oral)   Resp 18   Ht 5' 6.61\" (1.692 m)   Wt 133 lb 12 oz (60.7 kg)   LMP 12/16/2020 (Exact Date)   SpO2 99%   BMI 21.19 kg/m           PHYSICAL EXAM    General Appearance: Alert, cooperative, no distress but a little nervous, appears stated age  Head: Normocephalic, without obvious abnormality, atraumatic  Eyes: PERRL, conjunctiva/corneas clear, EOM's intact  Ears: TM's clear and retracted,external ear canals with mild amount of cerumen, both ears  Nose: Nares clear, septum midline,mucosa pink and moist, no drainage  Throat: Lips, mucosa, and tongue moist without lesions; teeth clean  Neck: Supple, symmetrical, trachea midline, no adenopathy;  thyroid: not enlarged, symmetric, no tenderness/mass/nodules  Back: Symmetric, no curvature, ROM normal, no CVA tenderness  Lungs: Clear to " auscultation bilaterally, respirations unlabored  Breasts: not examined today  Heart: Regular rate and rhythm, S1 and S2 normal, no murmur  Abdomen: Soft, non-tender, bowel sounds active,  no masses, no organomegaly  Pelvic:Normally developed genitalia with no pubic hair, no external lesions or eruptions. Vagina and cervix show no lesions, inflammation, discharge or tenderness. No cystocele, No rectocele. Uterus anteverted and non-gravid size.  No adnexal mass or tenderness.  Extremities: Extremities have symmetric bulk and tone, atraumatic, no cyanosis or edema  Skin: no rashes or lesions, warm  Neurologic: smooth coordination during exam, +2/4 DTRs in patellar tendons    Labs pending         Medication List          Accurate as of January 6, 2021  5:58 PM. If you have any questions, ask your nurse or doctor.            CONTINUE taking these medications    Claritin 10 mg tablet  INSTRUCTIONS: Take by mouth.  Generic drug: loratadine        multivitamin per tablet  Also known as: ONE A DAY  INSTRUCTIONS: Take 1 tablet by mouth.             Spent 40min on physical and an additional 40min on pap history and need for intervention; anxiety, immunizations, dental/facial pain, diet/nutrition.    Additional Screenings Completed Today:

## 2021-07-06 VITALS
BODY MASS INDEX: 19.93 KG/M2 | OXYGEN SATURATION: 100 % | SYSTOLIC BLOOD PRESSURE: 108 MMHG | WEIGHT: 127 LBS | TEMPERATURE: 98 F | DIASTOLIC BLOOD PRESSURE: 58 MMHG | HEART RATE: 81 BPM | HEIGHT: 67 IN

## 2021-07-07 NOTE — TELEPHONE ENCOUNTER
"\"On Friday 6/18 after dinner I had shrimp cocktail, the next day, I felt a little sick. Throughout the day I had a headache, dizziness on and off, just didn't feel well and diarrhea. I also had body aches.   Then the next day I vomited one time.  Temp was 99.0  On Monday 6/21 I cancelled an appt., thought I was better. But I am still having diarrhea, decreased appetite but no real normal meal.  Yesterday and this am I am back to where food just doesn't sound good, I had to even spit out mashed potatoes.  Diarrhea yesterday was 6-7 times  This morning 3 times. No vomiting   I am urinating ok\"  Denies constant abdominal pain but does state\"I have a dull aching feeling.\"  Denies other sx at this time  Denies abdominal pain or fever this am  Triaged and gave home care advice and to be seen within 24 hrs  Also offered WIC if needed.  Call back if needed        Reason for Disposition    [1] MODERATE diarrhea (e.g., 4-6 times / day more than normal) AND [2] present > 48 hours (2 days)    Additional Information    Negative: [1] Drinking very little AND [2] dehydration suspected (e.g., no urine > 12 hours, very dry mouth, very lightheaded)    Negative: Patient sounds very sick or weak to the triager    Negative: [1] SEVERE diarrhea (e.g., 7 or more times / day more than normal) AND [2] age > 60 years    Negative: [1] Constant abdominal pain AND [2] present > 2 hours    Negative: [1] Fever > 103 F (39.4 C) AND [2] not able to get the fever down using Fever Care Advice    Negative: [1] SEVERE diarrhea (e.g., 7 or more times / day more than normal) AND [2] present > 24 hours (1 day)    Protocols used: DIARRHEA-A-AH      "

## 2021-07-07 NOTE — PROGRESS NOTES
OUTPATIENT VISIT NOTE                                                   Date of Visit: 6/28/2021     Chief Complaint   Chief Complaint   Patient presents with     Diarrhea     since 6/19         History of Present Illness   Irina Marino is a 35 y.o. female has been sick since 6/19/2021.  Diarrhea started that day.  Poor appetite.  Had dizziness, joint pain, myalgias, nausea., some mid back pain  .Vomited once that night.  Lightheadedness.  Headache.  Felt a little better after a couple of days, but diarrhea persisted.   Poor appetite continued.  Diarrhea had started improve 5 days ago, but the next day had watery diarrhea.  Continues with headache.  Feels dry.  Still kind of achey.  Highest temp has been 99.5. has felt chilled but no rigors. Last time was yesterday.  Has had some sweats.  Still having diarrhea--three watery stools today. Yesterday had four stools, the day before five.  Has seen some mucous in stool.  Drinking fluids well.  Somewhat decrease in urination.  No pain with urination.  No blood in urine.  Gets cramping with eating.  Mild aching in stomach all the time.  Some left flank pain.  No cough.    No known exposure.    Used some peptobismol a week ago. And again a couple of days ago.               MEDICATIONS   Current Outpatient Medications on File Prior to Visit   Medication Sig Dispense Refill     loratadine (CLARITIN) 10 mg tablet Take by mouth.       multivitamin (ONE A DAY) per tablet Take 1 tablet by mouth.       No current facility-administered medications on file prior to visit.          SOCIAL HISTORY   Social History     Tobacco Use     Smoking status: Never Smoker     Smokeless tobacco: Never Used     Tobacco comment: no passive exposure   Substance Use Topics     Alcohol use: Yes     Frequency: 2-3 times a week     Drinks per session: 1 or 2     Binge frequency: Never           Physical Exam   Vitals:    06/28/21 1204   BP: 108/58   Patient Site: Left Arm   Patient Position:  "Sitting   Cuff Size: Adult Regular   Pulse: 81   Temp: 98  F (36.7  C)   TempSrc: Oral   SpO2: 100%   Weight: 127 lb (57.6 kg)   Height: 5' 6.5\" (1.689 m)        GENERAL:   Alert. Oriented.  EYES: Clear  HENT:  Ears: R TM pearly gray. Normal landmarks. L TM pearly gray.  Normal landmarks  Nose: Clear.  Sinuses: Nontender.  Oropharynx:  No erythema. No exudate.  NECK: Supple. No adenopathy.  LUNGS: Clear to ascultation.  No crackles.  No wheezing  HEART: RRR  SKIN:  No rash.   ABDOMEN:  +BS. Mild diffuse tenderness. Soft, no guarding, rebound, rigidity,mass, or organomegaly. No CVA tenderness       Diagnostic Studies   LABS:  Results for orders placed or performed in visit on 06/28/21   Urinalysis-UC if Indicated   Result Value Ref Range    Color, UA Yellow Colorless, Yellow, Straw, Light Yellow    Clarity, UA Clear Clear    Glucose, UA Negative Negative    Protein, UA Negative Negative    Bilirubin, UA Negative Negative    Urobilinogen, UA 0.2 E.U./dL 0.2 E.U./dL, 1.0 E.U./dL    pH, UA 5.5 5.0 - 8.0    Blood, UA Negative Negative    Ketones, UA Negative Negative    Nitrite, UA Negative Negative    Leukocytes, UA Negative Negative    Specific Gravity, UA 1.010 1.005 - 1.030   HM2(CBC w/o Differential)   Result Value Ref Range    WBC 6.3 4.0 - 11.0 thou/uL    RBC 4.83 3.80 - 5.40 mill/uL    Hemoglobin 14.2 12.0 - 16.0 g/dL    Hematocrit 41.3 35.0 - 47.0 %    MCV 86 80 - 100 fL    MCH 29.4 27.0 - 34.0 pg    MCHC 34.4 32.0 - 36.0 g/dL    RDW 12.8 11.0 - 14.5 %    Platelets 244 140 - 440 thou/uL    MPV 9.4 7.0 - 10.0 fL            Assessment and Plan   1. Diarrhea, unspecified type  Urinalysis-UC if Indicated    HM2(CBC w/o Differential)    Comprehensive Metabolic Panel    ciprofloxacin HCl (CIPRO) 250 MG tablet         Discussed likely infectious--may be viral or bacterial.  No alarming symptoms  Discussed stool cultures or empiric treatment with antibiotics.  She would like to try some antibiotics.    Good fluid " intake.  Diet as tolerated.    Recheck for worsening or not improving.      Discussed signs / symptoms that warrant urgent / emergent medical attention.     Recheck if worsening or not improving.       Patric Barrett MD        Pertinent History     The following portions of the patient's history were reviewed and updated as appropriate: allergies, current medications, past family history, past medical history, past social history, past surgical history and problem list.

## 2021-07-07 NOTE — PROGRESS NOTES
Party 6/18. Ate cooked shrimp, lamb chops, deviled eggs.  Since then, 4-8 diarrhea stools/day.  Emesis x 1.  Dizziness.

## 2021-07-11 ENCOUNTER — HEALTH MAINTENANCE LETTER (OUTPATIENT)
Age: 36
End: 2021-07-11

## 2021-07-13 ENCOUNTER — HOSPITAL ENCOUNTER (OUTPATIENT)
Dept: PHYSICAL THERAPY | Facility: REHABILITATION | Age: 36
End: 2021-07-13
Payer: COMMERCIAL

## 2021-07-13 DIAGNOSIS — M26.623 BILATERAL TEMPOROMANDIBULAR JOINT PAIN-DYSFUNCTION SYNDROME: Primary | ICD-10-CM

## 2021-07-13 DIAGNOSIS — G89.29 CHRONIC NECK PAIN: ICD-10-CM

## 2021-07-13 DIAGNOSIS — G89.29 CHRONIC BILATERAL THORACIC BACK PAIN: ICD-10-CM

## 2021-07-13 DIAGNOSIS — M43.6 NECK STIFFNESS: ICD-10-CM

## 2021-07-13 DIAGNOSIS — M54.2 CHRONIC NECK PAIN: ICD-10-CM

## 2021-07-13 DIAGNOSIS — M54.6 CHRONIC BILATERAL THORACIC BACK PAIN: ICD-10-CM

## 2021-07-13 PROCEDURE — 97140 MANUAL THERAPY 1/> REGIONS: CPT | Mod: GP | Performed by: PHYSICAL THERAPIST

## 2021-07-13 NOTE — ADDENDUM NOTE
Encounter addended by: Ingrid Curtis, PT on: 7/13/2021 1:24 PM   Actions taken: Visit diagnoses modified

## 2021-07-28 ENCOUNTER — HOSPITAL ENCOUNTER (OUTPATIENT)
Dept: PHYSICAL THERAPY | Facility: REHABILITATION | Age: 36
End: 2021-07-28
Payer: COMMERCIAL

## 2021-07-28 DIAGNOSIS — G89.29 CHRONIC BILATERAL THORACIC BACK PAIN: ICD-10-CM

## 2021-07-28 DIAGNOSIS — M54.2 CHRONIC NECK PAIN: ICD-10-CM

## 2021-07-28 DIAGNOSIS — M26.623 BILATERAL TEMPOROMANDIBULAR JOINT PAIN-DYSFUNCTION SYNDROME: Primary | ICD-10-CM

## 2021-07-28 DIAGNOSIS — G89.29 CHRONIC NECK PAIN: ICD-10-CM

## 2021-07-28 DIAGNOSIS — M43.6 NECK STIFFNESS: ICD-10-CM

## 2021-07-28 DIAGNOSIS — M54.6 CHRONIC BILATERAL THORACIC BACK PAIN: ICD-10-CM

## 2021-07-28 PROCEDURE — 97140 MANUAL THERAPY 1/> REGIONS: CPT | Mod: GP | Performed by: PHYSICAL THERAPIST

## 2021-07-28 NOTE — PROGRESS NOTES
"Outpatient Physical Therapy Discharge Note     Patient: Irina Marino  : 1985    Beginning/End Dates of Reporting Period:  2/3/21 to 21    Referring Provider: Dr. Pope    Therapy Diagnosis: TMJ      Client Self Report: Pt slept \"weird\" last night. L UT very tight. Has does acupuncture 4x - community acupunture.   Pt was getting some \"locking\" in R TMJ - felt better after acupunture yesterday. Some morning she has not been clenching.     Objective Measurements:  Objective Measure: palpation: tightness to R SCM/mastoid, L levator/UT, R cervical facet joints       Goals:MET   Pt. will be independent with home exercise program in : 6 weeks;Comment  Comment:: for pain <= 1/10  Pt. will decrease use of medication for pain for improved quality of life in : 6 weeks    Plan:  Discharge from therapy.  Continue with self-care including acupuncture.     Reason for Discharge: No further expectation of progress. MET goals     Equipment Issued: none    Discharge Plan: Patient to continue home program.  "

## 2021-09-05 ENCOUNTER — HEALTH MAINTENANCE LETTER (OUTPATIENT)
Age: 36
End: 2021-09-05

## 2022-02-20 ENCOUNTER — HEALTH MAINTENANCE LETTER (OUTPATIENT)
Age: 37
End: 2022-02-20

## 2022-03-14 ENCOUNTER — HOSPITAL ENCOUNTER (OUTPATIENT)
Dept: MAMMOGRAPHY | Facility: CLINIC | Age: 37
Discharge: HOME | End: 2022-03-14
Attending: PHYSICIAN ASSISTANT
Payer: COMMERCIAL

## 2022-03-14 DIAGNOSIS — N63.21 MASS OF UPPER OUTER QUADRANT OF LEFT BREAST: ICD-10-CM

## 2022-03-14 PROCEDURE — 77062 BREAST TOMOSYNTHESIS BI: CPT

## 2022-03-14 PROCEDURE — 76642 ULTRASOUND BREAST LIMITED: CPT | Mod: LT

## 2022-03-31 ENCOUNTER — HOSPITAL ENCOUNTER (OUTPATIENT)
Dept: MAMMOGRAPHY | Facility: CLINIC | Age: 37
Discharge: HOME | End: 2022-03-31
Attending: PHYSICIAN ASSISTANT
Payer: COMMERCIAL

## 2022-03-31 DIAGNOSIS — N63.20 MASS OF LEFT BREAST: ICD-10-CM

## 2022-03-31 PROCEDURE — 250N000009 HC RX 250: Performed by: PHYSICIAN ASSISTANT

## 2022-03-31 PROCEDURE — 999N000065 MA POST PROCEDURE LEFT

## 2022-03-31 PROCEDURE — A4648 IMPLANTABLE TISSUE MARKER: HCPCS

## 2022-03-31 PROCEDURE — 88305 TISSUE EXAM BY PATHOLOGIST: CPT | Mod: TC | Performed by: PHYSICIAN ASSISTANT

## 2022-03-31 RX ADMIN — LIDOCAINE HYDROCHLORIDE 5 ML: 10 INJECTION, SOLUTION INFILTRATION; PERINEURAL at 07:54

## 2022-03-31 NOTE — DISCHARGE INSTRUCTIONS

## 2022-04-01 ENCOUNTER — TELEPHONE (OUTPATIENT)
Dept: MAMMOGRAPHY | Facility: CLINIC | Age: 37
End: 2022-04-01
Payer: COMMERCIAL

## 2022-04-01 LAB
PATH REPORT.COMMENTS IMP SPEC: NORMAL
PATH REPORT.COMMENTS IMP SPEC: NORMAL
PATH REPORT.FINAL DX SPEC: NORMAL
PATH REPORT.GROSS SPEC: NORMAL
PATH REPORT.MICROSCOPIC SPEC OTHER STN: NORMAL
PATH REPORT.RELEVANT HX SPEC: NORMAL
PHOTO IMAGE: NORMAL

## 2022-04-01 PROCEDURE — 88305 TISSUE EXAM BY PATHOLOGIST: CPT | Mod: 26 | Performed by: PATHOLOGY

## 2022-04-01 NOTE — TELEPHONE ENCOUNTER
After review by Breast Center Radiologist, Dr. Marito Barraza, Irina was called,  verified, and given her 3/31/2022 Left Breast Biopsy results (Fibroadenoma) and recommended Follow up (Annual Screening Mammograms @ age 40).   Patient denies any concerns with the biopsy site. Reports some discomfort but managed with ice and tylenol.  Ordering provider was informed of the results and follow up plan.  I encouraged her to contact her doctor with any further breast concerns.  Patient verbalized understanding and agrees with the plan of care.      [facility]  Irina Marino [id number]  F, 1985  Surgical Pathology Report (Final result) [id number]  Authorizing Provider: Flori Manuel PA-C Ordering Provider: Flori Manuel PA-C  Ordering Location: [facility]  Collected: 2022 07:55 AM  Pathologist: Shay Kingsley MD Received: 2022 11:42 AM  .  Specimens  A Breast, Left  .  .  Final Diagnosis  A. Left breast 2.3 cm lesion at 1:00, 2 cm from the nipple:  - Negative for malignancy.  - Fibroadenoma.  Electronically signed by Shay Kingsley MD on 2022 at 3:08 PM

## 2022-06-07 ENCOUNTER — TRANSFERRED RECORDS (OUTPATIENT)
Dept: HEALTH INFORMATION MANAGEMENT | Facility: CLINIC | Age: 37
End: 2022-06-07
Payer: COMMERCIAL

## 2022-07-09 ENCOUNTER — TELEPHONE (OUTPATIENT)
Dept: NURSING | Facility: CLINIC | Age: 37
End: 2022-07-09

## 2022-07-09 NOTE — TELEPHONE ENCOUNTER
Coronavirus (COVID-19)     Caller Name (Patient, parent, daughter/son, grandparent, etc)  self    Reason for call   Positive Coronavirus (COVID-19) Home test, assess symptoms,  review [facility] recommendations        Gather patient reported symptoms   Assessment   Current Symptoms at time of phone call, reported by patient: (if no symptoms, document No symptoms] Mild cough, sore throat, fatigue   Date of Symptom(s) onset (if applicable) Thursday joslyn     If at time of call, Patients symptoms hare worsened, the Patient should contact 911 or have someone drive them to Emergency Dept promptly:      If Patient calling 911, inform 911 personal that you have tested positive for the Coronavirus (COVID-19).  Place mask on and await 911 to arrive.    If Emergency Dept, If possible, please have another adult drive you to the Emergency Dept but you need to wear mask when in contact with other people.      Monoclonal Antibody Administration    You may be eligible to receive a new treatment with a monoclonal antibody for preventing hospitalization in patients at high risk for complications from COVID-19. This medication is still experimental and available on a limited basis; it is given through an IV and must be given at an infusion center. Please note that not all people who are eligible will receive the medication since it is in limited supply.  Is the patient symptomatic and onset of symptoms within the last 7 days?  Yes  Is the patient interested in a visit with a provider to discuss treatment options?: Yes  Is the patient seen at [facility]?  No: Warm transfer caller to [phone] to be scheduled with a virtual urgent provider.  During transfer, instruct  on appropriate time frame for visit     Review information with Patient    Your result was positive. This means you have COVID-19 (coronavirus).      How can I protect others?    These guidelines are for isolating before returning to work,  school or .       If you DO have symptoms:  o Stay home and away from others  - For at least 5 days after your symptoms started, AND   - You are fever free for 24 hours (with no medicine that reduces fever), AND  - Your other symptoms are better.  o Wear a mask for 10 full days any time you are around others.    If you DON'T have symptoms:  o Stay at home and away from others for at least 5 days after your positive test.  o Wear a mask for 10 full days any time you are around others.    There may be different guidelines for healthcare facilities. Please check with the specific sites before arriving.     If you've been told by a doctor that you were severely ill with COVID-19 or are immunocompromised, you should isolate for at least 10 days.    You should not go back to work until you meet the guidelines above for ending your home isolation. You don't need to be retested for COVID-19 before going back to work--studies show that you won't spread the virus if it's been at least 10 days since your symptoms started (or 20 days, if you have a weak immune system).    Employers, schools, and daycares: This is an official notice for this person's medical guidelines for returning in-person. They must meet the above guidelines before going back to work, school, or  in person.    You will receive a positive COVID-19 letter via H5 or the mail soon with additional self-care information.      Would you like me to review some of that information with you now?  No    If you were tested for an upcoming procedure, please contact your provider for next steps.     Rosi Titus RN

## 2022-10-23 ENCOUNTER — HEALTH MAINTENANCE LETTER (OUTPATIENT)
Age: 37
End: 2022-10-23

## 2022-11-28 ENCOUNTER — TELEPHONE (OUTPATIENT)
Dept: FAMILY MEDICINE | Facility: CLINIC | Age: 37
End: 2022-11-28

## 2022-11-28 NOTE — TELEPHONE ENCOUNTER
Reason for Call:  Appointment Request    Patient requesting this type of appt:  OV    Requested provider: any female provider    Reason patient unable to be scheduled: scheduled 12/5/22 with Dr. Ford    When does patient want to be seen/preferred time: 3-7 days    Comments: has ingrown hair on bikini line and would like flu and COVID booster as well.     Could we send this information to you [facility] or would you prefer to receive a phone call?:   No preference   Okay to leave a detailed message?: No  Task completed.     Call taken on 11/28/2022 at 12:31 PM by CATHI Felder

## 2022-12-05 ENCOUNTER — OFFICE VISIT (OUTPATIENT)
Dept: FAMILY MEDICINE | Facility: CLINIC | Age: 37
End: 2022-12-05
Payer: COMMERCIAL

## 2022-12-05 VITALS
TEMPERATURE: 99.2 F | BODY MASS INDEX: 21.5 KG/M2 | HEART RATE: 84 BPM | RESPIRATION RATE: 16 BRPM | DIASTOLIC BLOOD PRESSURE: 68 MMHG | HEIGHT: 67 IN | SYSTOLIC BLOOD PRESSURE: 110 MMHG | WEIGHT: 137 LBS

## 2022-12-05 DIAGNOSIS — L72.9 SKIN CYST: Primary | ICD-10-CM

## 2022-12-05 PROCEDURE — 0124A COVID-19 VACCINE BIVALENT BOOSTER 12+ (PFIZER): CPT | Performed by: FAMILY MEDICINE

## 2022-12-05 PROCEDURE — 91312 COVID-19 VACCINE BIVALENT BOOSTER 12+ (PFIZER): CPT | Performed by: FAMILY MEDICINE

## 2022-12-05 PROCEDURE — 90471 IMMUNIZATION ADMIN: CPT | Performed by: FAMILY MEDICINE

## 2022-12-05 PROCEDURE — 90686 IIV4 VACC NO PRSV 0.5 ML IM: CPT | Performed by: FAMILY MEDICINE

## 2022-12-05 PROCEDURE — 99214 OFFICE O/P EST MOD 30 MIN: CPT | Mod: 25 | Performed by: FAMILY MEDICINE

## 2022-12-05 RX ORDER — BUSPIRONE HYDROCHLORIDE 5 MG/1
5 TABLET ORAL 2 TIMES DAILY
COMMUNITY
End: 2023-10-25

## 2022-12-05 NOTE — PROGRESS NOTES
Assessment & Plan     Skin cyst, right labia majora  Based on exam today, I&D would not be indicated or helpful. Does not need antibiotics. I would not attempt cyst removal myself, as there is not a clear cut single mobile cyst. I think a lot of the palpable mass is simply residual from inflammation, and I am not sure we could easily locate and remove the presumed cyst that was the source of the inflammation. She would like to try to pursue removal. Patient wants to avoid unnecessary visits. I called General Surgery clinic, and they recommended Gyn. [facility] said the physician providers there would be able to perform this type of skin procedure if indicated. [facility] said that some of their providers will do skin procedures in the genital area, so patient will need to check when scheduling. Info passed to patient and referral placed. She was also advised that specialist might recommend leaving the area alone and decline to attempt removal.  - Adult Dermatology Referral  - Ob/Gyn Referral    30 min spent on day of service obtaining history, performing exam, counseling patient on recommendations, as well as making phone calls to 3 specialty clinics to help streamline this for patient, who was somewhat frustrated with the situation               No follow-ups on file.    Kellie Clark MD  [facility] MARIA ANTONIA Arevalo is a 36 year old, presenting for the following health issues:  Ingrowning hair      HPI   A few weeks - bothered by tender lump under the skin waxing and waning in her right labia majora.  Started as an ingrown hair, a small bump, which has increased in size.  Did manipulate or squeeze at the area a while back, seemed to worsen after that.  Has tried hot soaks in the bath with Epson salts.  No fevers or redness, no drainage.  Has been well.    Since this area has been bothering her more less persistently for a few weeks she is interested in removal, if  "possible.  She would like any procedures done before the end of the year if possible.          Review of Systems         Objective    /68   Pulse 84   Temp 99.2  F (37.3  C) (Oral)   Resp 16   Ht 1.689 m (5' 6.5\")   Wt 62.1 kg (137 lb)   LMP 12/02/2022   BMI 21.78 kg/m    Body mass index is 21.78 kg/m .  Physical Exam     General Pleasant well-appearing no acute distress  Genitalia: Normal external genitalia. On palpation in right labia majora, she has a tubular palpable mass just below underlying skin, seems to be connected to overlying skin. Mobile, very slightly tender. No overlying warmth, no erythema.   Lymph: no inguinal LAD                  "

## 2022-12-13 ENCOUNTER — TRANSFERRED RECORDS (OUTPATIENT)
Dept: HEALTH INFORMATION MANAGEMENT | Facility: CLINIC | Age: 37
End: 2022-12-13

## 2023-04-02 ENCOUNTER — HEALTH MAINTENANCE LETTER (OUTPATIENT)
Age: 38
End: 2023-04-02

## 2023-08-23 ENCOUNTER — TELEPHONE (OUTPATIENT)
Dept: FAMILY MEDICINE | Facility: CLINIC | Age: 38
End: 2023-08-23
Payer: COMMERCIAL

## 2023-08-23 DIAGNOSIS — N63.0 MASS OF BREAST, UNSPECIFIED LATERALITY: ICD-10-CM

## 2023-08-23 DIAGNOSIS — N64.4 BREAST PAIN: Primary | ICD-10-CM

## 2023-08-23 NOTE — TELEPHONE ENCOUNTER
Will route encounter to Dr. Danielson to place a referral if appropriate per patient request.    ALEX MurrayN, RN  [facility]

## 2023-08-23 NOTE — TELEPHONE ENCOUNTER
Order/Referral Request    Who is requesting: Patient    Orders being requested: Mammo    Reason service is needed/diagnosis: Lump in left breast is starting to hurt again, feels about the same size, but would like to get it re-evaulated. Had a Mammo in March of 2022 and had Mammo with U/S and biopsy done, but did not go through surgery to have it removed. Because Pt is under the age of 40, Pt was told she will need a referral by PCP.     When are orders needed by: ASAP    Has this been discussed with Provider:     Does patient have a preference on a Group/Provider/Facility? [facility]    Does patient have an appointment scheduled?: No    Where to send orders: Place orders within Epic    Could we send this information to you in [facility] or would you prefer to receive a phone call?:   Patient would prefer a phone call   Okay to leave a detailed message?: Yes at Home number on file [phone] (home)

## 2023-08-24 ENCOUNTER — TELEPHONE (OUTPATIENT)
Dept: FAMILY MEDICINE | Facility: CLINIC | Age: 38
End: 2023-08-24
Payer: COMMERCIAL

## 2023-08-24 NOTE — TELEPHONE ENCOUNTER
General Call    Contacts         Type Contact Phone/Fax    08/24/2023 11:10 AM CDT Phone (Incoming) [facility] (Other)           Reason for Call:  imaging or adult general surgery.    What are your questions or concerns:  [facility] call states patient call imaging to schedule appointment for breast pain, there were no order for mammogram but there is order for Adult General Surg Referral. Please advise and clarify.    Date of last appointment with provider: 12/05/2022 with Dr. Clark.    Could we send this information to you in [employer] or would you prefer to receive a phone call?:   Patient would prefer a phone call   Okay to leave a detailed message?: Yes at Cell number on file:    Telephone Information:   Mobile [phone]

## 2023-08-24 NOTE — TELEPHONE ENCOUNTER
Call patient to relay provider message below.  Referral contact information also provided.    ROSALINE Murray, RN  [facility]      Referral placed - patient can call to schedule appointment

## 2023-08-24 NOTE — TELEPHONE ENCOUNTER
Dr Danielson, please review referral placed. Pt called reporting facility is not able to schedule because the wrong referral was placed. Please review and add thank you.

## 2023-08-25 NOTE — TELEPHONE ENCOUNTER
Called pt in an attempt to relay Dr. Danielson's message. LVM to call clinic back.      If pt calls back, please relay Dr. Danielson's message and inform her that orders for memmo and US are in epic. Thanks     Samy Stevens, BSN RN  [facility]

## 2023-08-25 NOTE — TELEPHONE ENCOUNTER
Patient had mammo and ultrasound in 3/2022. Was offered biopsy. Declined it then but would consider it now.     If protocol is to do mammo and ultrasound first before seeing the surgeon, we can do that (take this as a verbal order for that). I thought if she saw the surgeon first, the surgeon might choose to do an MRI instead.

## 2023-08-25 NOTE — TELEPHONE ENCOUNTER
I placed the imaging orders so she can do those. Please call and let her know.    If I placed the wrong surgery referral - I do not know what else to put? Breast surgery has always gone to general surgery...

## 2023-08-26 NOTE — TELEPHONE ENCOUNTER
I realize the patient wants a mammogram, however, given her history, I felt it best that she have her case reviewed by a surgeon. This it NOT because she needs OR wants surgery. It is so a (breast) surgeon can determine the best next step. Perhaps what is recommended is a mammogram (and likely ultrasound) but perhaps it is an MRI which could mean AVOIDING surgery. I cannot make this choice and offer an MRI. The surgeon can.    If she feels strongly she just wants a mammogram as her next step, the mammogram is ordered and she can pursue it. If it is abnormal, she'll also need an ultrasound and then, likely to see a (breast) surgeon.    This explanation is in part to explain my reasoning (for the patient) and to help the TC understand, too (so no matter who calls the patient, please be sure Rose Mary SCHMITT sees this).

## 2023-08-28 NOTE — TELEPHONE ENCOUNTER
Pt returned call, msg was delivered and pt was able to schedule mammogram. No further action is needed.

## 2023-08-28 NOTE — TELEPHONE ENCOUNTER
Called pt and relayed message. Pt verbalized understanding.      Samy Stevens, BSN RN  [facility]

## 2023-08-28 NOTE — TELEPHONE ENCOUNTER
Okay to leave a detailed message?: Yes at Home number on file [phone] (home)     Unable to leave detailed message due to length of MD message. If patient returns call, advise her per MD note below.

## 2023-09-07 ENCOUNTER — ANCILLARY PROCEDURE (OUTPATIENT)
Dept: MAMMOGRAPHY | Facility: CLINIC | Age: 38
End: 2023-09-07
Attending: FAMILY MEDICINE
Payer: COMMERCIAL

## 2023-09-07 DIAGNOSIS — N64.4 BREAST PAIN: ICD-10-CM

## 2023-09-07 DIAGNOSIS — N63.0 MASS OF BREAST, UNSPECIFIED LATERALITY: ICD-10-CM

## 2023-09-07 PROCEDURE — 76642 ULTRASOUND BREAST LIMITED: CPT | Mod: LT | Performed by: STUDENT IN AN ORGANIZED HEALTH CARE EDUCATION/TRAINING PROGRAM

## 2023-09-07 PROCEDURE — G0279 TOMOSYNTHESIS, MAMMO: HCPCS | Mod: GC | Performed by: STUDENT IN AN ORGANIZED HEALTH CARE EDUCATION/TRAINING PROGRAM

## 2023-09-07 PROCEDURE — 77066 DX MAMMO INCL CAD BI: CPT | Mod: GC | Performed by: STUDENT IN AN ORGANIZED HEALTH CARE EDUCATION/TRAINING PROGRAM

## 2023-09-07 NOTE — LETTER
September 18, 2023  Re: Irina Marino  YOB: 1985    Dear Colleague,    Thank you for your referral to the [facility] [place]. Unfortunately, the patient declined services and we were unable to schedule the referral.      If you have any questions or concerns, please contact our office at Dept: [phone].        Sincerely,  [facility]

## 2023-09-07 NOTE — LETTER
Irina Marino  [address] [place] [facility] [address]        September 7, 2023    Date of Exam:     Dear Irina:    Thank you for your recent visit.     Breast Imaging Result: We are pleased to inform you that the results of your recent breast imaging show no evidence of malignancy (cancer).    Breast Density: Your breast imaging shows that you have dense breast tissue. This means you have slightly more risk of getting breast cancer. It also means your breast imaging will be harder to read. While it's harder to read, it's still important to do breast imaging. In fact, yearly breast imaging is even more important for anyone at higher risk. You may need to do more testing if you have enough risk.    If you are having any problems such as a lump or pain in your breast, please discuss this with your health care team if you haven't already. You may need more testing.    As you know, it's important to find cancer early. Not all cancers are found through breast imaging, but it's the most accurate method for finding cancer early. A complete check should include breast imaging, physical exams, and breast self-exams. The American College of Radiology and the Society of Breast Imaging advises that yearly breast imaging should start at age 40.    A report of your breast imaging results was sent to: Thelma Danielson    Your breast imaging will become part of your medical file here at [facility] for at least 10 years. You are responsible for telling any new health care team or breast imaging site of the date and place of this exam.     We appreciate the opportunity to participate in your health care.    Sincerely,  Erica Spivey MD  [facility] [place]

## 2023-09-08 NOTE — PROGRESS NOTES
New Patient Oncology Nurse Navigator Note     Referring provider: Thelma Danielson MD      Referring Clinic/Organization: [facility]  -[facility]      Referred to (specialty:) Cancer Surgery     Requested provider (if applicable): NA     Date Referral Received: September 7, 2023     Evaluation for:  Painful breast fibrodenoma, previously biopsied  N63.0 (ICD-10-CM) - Mass of breast, unspecified laterality      Clinical History (per Nurse review of records provided):      9/7/23 Diagnostic Mammogram and Ultrasound done.   FINDINGS:  MAMMOGRAM:  TECHNIQUE: Standard mammographic views were performed with  tomosynthesis and synthetic mammogram.     BREAST DENSITY: Extremely dense.     Biopsy marker left breast at site of palpable concern and previous  biopsy proven fibroadenoma. No significant change from prior in either  breast.      ULTRASOUND:  Targeted ultrasound left breast by technologist and radiologist. There  is a 2.2 x 1.4 x 2.4 cm round, hypoechoic, circumscribed mass left  breast 1:00 position, 2 cm from nipple corresponding to biopsy-proven  fibroadenoma. No significant change since 2022.  No ultrasound  abnormality in surrounding breast tissue.      Breast Biopsy 3/31/2022:   Final Diagnosis   A.  Left breast 2.3 cm lesion at 1:00, 2 cm from the nipple:  - Negative for malignancy.  - Fibroadenoma.   Electronically signed by Shay Kingsley MD on 4/1/2022 at  3:08 PM       Records Location: See Bookmarked material     Records Needed: NA     Additional testing needed prior to consult: NA    Payor: ARE / Plan: [employer] INDIVIDUAL FAMILY PLANS WITH [employer] / Product Type: HMO /     September 8, 2023    Called patient to introduced myself and role as nurse navigator with [facility] Hematology/Oncology department and to inform them that we have received the referral for a diagnosis of breast mass (Fibrodenoma) from Dr. Danielson.   Patient did not  answer the phone so a detailed message was left for them including NPS number. Requested callback to speak to a  to set the consult date/time/location. Explained to patient in the message that they will receive a call from our new patient scheduling team (NPS number below, hours are Monday - Friday 8am - 4:30 pm) in the next 1-2 business days to schedule the consultation. Encouraged them to call back with any questions.     Kaylie Parnell, RN, BSN  [facility] Hematology/Oncology Nurse Navigator  [phone]

## 2023-09-12 ENCOUNTER — TRANSFERRED RECORDS (OUTPATIENT)
Dept: HEALTH INFORMATION MANAGEMENT | Facility: CLINIC | Age: 38
End: 2023-09-12
Payer: COMMERCIAL

## 2023-10-24 NOTE — PATIENT INSTRUCTIONS
"Cut out or limit carbonated beverages, coffee/caffeine, spicy/acidic foods.\    Schedule on mychart \"vaccine\" appt.    Dr. Fitch      Preventive Health Recommendations  Female Ages 26 - 39  Yearly exam:   See your health care provider every year in order to  Review health changes.   Discuss preventive care.    Review your medicines if you your doctor has prescribed any.    Until age 30: Get a Pap test every three years (more often if you have had an abnormal result).    After age 30: Talk to your doctor about whether you should have a Pap test every 3 years or have a Pap test with HPV screening every 5 years.   You do not need a Pap test if your uterus was removed (hysterectomy) and you have not had cancer.  You should be tested each year for STDs (sexually transmitted diseases), if you're at risk.   Talk to your provider about how often to have your cholesterol checked.  If you are at risk for diabetes, you should have a diabetes test (fasting glucose).  Shots: Get a flu shot each year. Get a tetanus shot every 10 years.   Nutrition:   Eat at least 5 servings of fruits and vegetables each day.  Eat whole-grain bread, whole-wheat pasta and brown rice instead of white grains and rice.  Get adequate Calcium and Vitamin D.     Lifestyle  Exercise at least 150 minutes a week (30 minutes a day, 5 days of the week). This will help you control your weight and prevent disease.  Limit alcohol to one drink per day.  No smoking.   Wear sunscreen to prevent skin cancer.  See your dentist every six months for an exam and cleaning.    "

## 2023-10-25 ENCOUNTER — OFFICE VISIT (OUTPATIENT)
Dept: FAMILY MEDICINE | Facility: CLINIC | Age: 38
End: 2023-10-25
Payer: COMMERCIAL

## 2023-10-25 VITALS
SYSTOLIC BLOOD PRESSURE: 114 MMHG | HEIGHT: 67 IN | BODY MASS INDEX: 21.47 KG/M2 | DIASTOLIC BLOOD PRESSURE: 71 MMHG | OXYGEN SATURATION: 100 % | WEIGHT: 136.8 LBS | TEMPERATURE: 97.4 F | HEART RATE: 71 BPM | RESPIRATION RATE: 16 BRPM

## 2023-10-25 DIAGNOSIS — N92.0 MENSTRUAL SPOTTING: ICD-10-CM

## 2023-10-25 DIAGNOSIS — N94.6 MENSTRUAL CRAMP: ICD-10-CM

## 2023-10-25 DIAGNOSIS — Z00.00 ROUTINE GENERAL MEDICAL EXAMINATION AT A HEALTH CARE FACILITY: ICD-10-CM

## 2023-10-25 DIAGNOSIS — Z13.220 SCREENING FOR LIPID DISORDERS: ICD-10-CM

## 2023-10-25 DIAGNOSIS — R35.0 FREQUENT URINATION: ICD-10-CM

## 2023-10-25 DIAGNOSIS — Z12.4 CERVICAL CANCER SCREENING: Primary | ICD-10-CM

## 2023-10-25 DIAGNOSIS — Z83.49 FAMILY HISTORY OF THYROID DISEASE: ICD-10-CM

## 2023-10-25 DIAGNOSIS — Z13.1 SCREENING FOR DIABETES MELLITUS: ICD-10-CM

## 2023-10-25 DIAGNOSIS — R33.9 INCOMPLETE BLADDER EMPTYING: ICD-10-CM

## 2023-10-25 LAB
ERYTHROCYTE [DISTWIDTH] IN BLOOD BY AUTOMATED COUNT: 12.9 % (ref 10–15)
HBA1C MFR BLD: 5 % (ref 0–5.6)
HCT VFR BLD AUTO: 39.9 % (ref 35–47)
HGB BLD-MCNC: 13.2 G/DL (ref 11.7–15.7)
MCH RBC QN AUTO: 28.9 PG (ref 26.5–33)
MCHC RBC AUTO-ENTMCNC: 33.1 G/DL (ref 31.5–36.5)
MCV RBC AUTO: 88 FL (ref 78–100)
PLATELET # BLD AUTO: 215 10E3/UL (ref 150–450)
RBC # BLD AUTO: 4.56 10E6/UL (ref 3.8–5.2)
WBC # BLD AUTO: 5.1 10E3/UL (ref 4–11)

## 2023-10-25 PROCEDURE — G0145 SCR C/V CYTO,THINLAYER,RESCR: HCPCS | Performed by: STUDENT IN AN ORGANIZED HEALTH CARE EDUCATION/TRAINING PROGRAM

## 2023-10-25 PROCEDURE — 99214 OFFICE O/P EST MOD 30 MIN: CPT | Mod: 25 | Performed by: STUDENT IN AN ORGANIZED HEALTH CARE EDUCATION/TRAINING PROGRAM

## 2023-10-25 PROCEDURE — 85027 COMPLETE CBC AUTOMATED: CPT | Performed by: STUDENT IN AN ORGANIZED HEALTH CARE EDUCATION/TRAINING PROGRAM

## 2023-10-25 PROCEDURE — 83036 HEMOGLOBIN GLYCOSYLATED A1C: CPT | Performed by: STUDENT IN AN ORGANIZED HEALTH CARE EDUCATION/TRAINING PROGRAM

## 2023-10-25 PROCEDURE — 82728 ASSAY OF FERRITIN: CPT | Performed by: STUDENT IN AN ORGANIZED HEALTH CARE EDUCATION/TRAINING PROGRAM

## 2023-10-25 PROCEDURE — 36415 COLL VENOUS BLD VENIPUNCTURE: CPT | Performed by: STUDENT IN AN ORGANIZED HEALTH CARE EDUCATION/TRAINING PROGRAM

## 2023-10-25 PROCEDURE — 87624 HPV HI-RISK TYP POOLED RSLT: CPT | Performed by: STUDENT IN AN ORGANIZED HEALTH CARE EDUCATION/TRAINING PROGRAM

## 2023-10-25 PROCEDURE — 80061 LIPID PANEL: CPT | Performed by: STUDENT IN AN ORGANIZED HEALTH CARE EDUCATION/TRAINING PROGRAM

## 2023-10-25 PROCEDURE — 99395 PREV VISIT EST AGE 18-39: CPT | Mod: 25 | Performed by: STUDENT IN AN ORGANIZED HEALTH CARE EDUCATION/TRAINING PROGRAM

## 2023-10-25 PROCEDURE — 80053 COMPREHEN METABOLIC PANEL: CPT | Performed by: STUDENT IN AN ORGANIZED HEALTH CARE EDUCATION/TRAINING PROGRAM

## 2023-10-25 PROCEDURE — 82670 ASSAY OF TOTAL ESTRADIOL: CPT | Performed by: STUDENT IN AN ORGANIZED HEALTH CARE EDUCATION/TRAINING PROGRAM

## 2023-10-25 PROCEDURE — 84443 ASSAY THYROID STIM HORMONE: CPT | Performed by: STUDENT IN AN ORGANIZED HEALTH CARE EDUCATION/TRAINING PROGRAM

## 2023-10-25 RX ORDER — CETIRIZINE HYDROCHLORIDE 10 MG/1
1 TABLET ORAL
COMMUNITY
Start: 2023-09-13 | End: 2023-10-25

## 2023-10-25 ASSESSMENT — ENCOUNTER SYMPTOMS
BREAST MASS: 1
NAUSEA: 0
HEMATURIA: 0
ABDOMINAL PAIN: 0
DIZZINESS: 0
SORE THROAT: 0
CONSTIPATION: 0
HEADACHES: 1
SHORTNESS OF BREATH: 0
DIARRHEA: 0
NERVOUS/ANXIOUS: 1
PARESTHESIAS: 0
FEVER: 0
MYALGIAS: 0
HEARTBURN: 0
DYSURIA: 0
WEAKNESS: 0
FREQUENCY: 1
ARTHRALGIAS: 0
PALPITATIONS: 0
CHILLS: 0
JOINT SWELLING: 0
COUGH: 0
HEMATOCHEZIA: 0
EYE PAIN: 0

## 2023-10-25 ASSESSMENT — PAIN SCALES - GENERAL: PAINLEVEL: NO PAIN (0)

## 2023-10-25 NOTE — PROGRESS NOTES
SUBJECTIVE:   CC: Irina is an 37 year old who presents for preventive health visit.       10/25/2023     2:00 PM   Additional Questions   Roomed by Nanci PARKS   Accompanied by self     Healthy Habits:     Getting at least 3 servings of Calcium per day:  Yes    Bi-annual eye exam:  Yes    Dental care twice a year:  Yes    Sleep apnea or symptoms of sleep apnea:  Daytime drowsiness    Diet:  Gluten-free/reduced    Frequency of exercise:  4-5 days/week    Duration of exercise:  30-45 minutes    Taking medications regularly:  Not Applicable    Additional concerns today:  Yes    Getting pain longer into periods  Had painful periods as a teenager  Severe pain, nausea, diarrhea  Having worsening cramping  Some spotting mid cycle    Skin changes  Saw dermatology for hair thinning  Taking nutrafol    Urinary freq  Incomplete emptying  -    Breast fibroadenoma    Work  Diet  Exercise  Fam hx ovarian, breast, colon ca    LMP  Contraception:   STD screening  Pap/hpv-done 1/6/2021, repeat in 3 years (1/2024)    Anxiety  -stopped meds    Today's PHQ-2 Score:       10/25/2023     1:54 PM   PHQ-2 ( 1999 [employer])   Q1: Little interest or pleasure in doing things 0   Q2: Feeling down, depressed or hopeless 1   PHQ-2 Score 1   Q1: Little interest or pleasure in doing things Not at all   Q2: Feeling down, depressed or hopeless Several days   PHQ-2 Score 1     Have you ever done Advance Care Planning? (For example, a Health Directive, POLST, or a discussion with a medical provider or your loved ones about your wishes): No, advance care planning information given to patient to review.  Patient declined advance care planning discussion at this time.    Social History     Tobacco Use    Smoking status: Never    Smokeless tobacco: Never    Tobacco comments:     no passive exposure   Substance Use Topics    Alcohol use: Yes     Alcohol/week: 10.0 standard drinks of alcohol           10/25/2023     1:54 PM   Alcohol Use   Prescreen: >3  drinks/day or >7 drinks/week? No          No data to display              Reviewed orders with patient.  Reviewed health maintenance and updated orders accordingly - Yes      Breast Cancer Screening:        10/25/2023     1:55 PM   Breast CA Risk Assessment (FHS-7)   Do you have a family history of breast, colon, or ovarian cancer? No / Unknown           Pertinent mammograms are reviewed under the imaging tab.    History of abnormal Pap smear: NO - age 30-65 PAP every 5 years with negative HPV co-testing recommended      Latest Ref Rng & Units 2021     9:17 AM   PAP / HPV   PAP Negative for squamous intraepithelial lesion or malignancy. Negative for squamous intraepithelial lesion or malignancy  Electronically signed by Kellie Childers CT (ASCP) on 2021 at  5:06 PM      HPV 16 DNA NEG Negative    HPV 18 DNA NEG Negative    Other HR HPV NEG Negative      Reviewed and updated as needed this visit by clinical staff   Tobacco  Allergies  Meds              Reviewed and updated as needed this visit by Provider                 No past medical history on file.   Past Surgical History:   Procedure Laterality Date    DARIUS MCKEON       OB History    Para Term  AB Living   0 0 0 0 0 0   SAB IAB Ectopic Multiple Live Births   0 0 0 0 0       Review of Systems   Constitutional:  Negative for chills and fever.   HENT:  Negative for congestion, ear pain, hearing loss and sore throat.    Eyes:  Negative for pain and visual disturbance.   Respiratory:  Negative for cough and shortness of breath.    Cardiovascular:  Negative for chest pain, palpitations and peripheral edema.   Gastrointestinal:  Negative for abdominal pain, constipation, diarrhea, heartburn, hematochezia and nausea.   Breasts:  Positive for breast mass. Negative for tenderness and discharge.   Genitourinary:  Positive for frequency, urgency and vaginal bleeding. Negative for dysuria, genital sores, hematuria, pelvic pain and  "vaginal discharge.   Musculoskeletal:  Negative for arthralgias, joint swelling and myalgias.   Skin:  Negative for rash.   Neurological:  Positive for headaches. Negative for dizziness, weakness and paresthesias.   Psychiatric/Behavioral:  Negative for mood changes. The patient is nervous/anxious.      OBJECTIVE:   /71 (BP Location: Right arm, Patient Position: Sitting, Cuff Size: Adult Regular)   Pulse 71   Temp 97.4  F (36.3  C) (Temporal)   Resp 16   Ht 1.692 m (5' 6.6\")   Wt 62.1 kg (136 lb 12.8 oz)   LMP 10/21/2023   SpO2 100%   BMI 21.68 kg/m    Physical Exam  Constitutional:       General: She is not in acute distress.     Appearance: She is well-developed.   HENT:      Head: Normocephalic and atraumatic.      Right Ear: Tympanic membrane, ear canal and external ear normal.      Left Ear: Tympanic membrane, ear canal and external ear normal.      Nose: Nose normal.      Mouth/Throat:      Pharynx: No oropharyngeal exudate.   Eyes:      Conjunctiva/sclera: Conjunctivae normal.      Pupils: Pupils are equal, round, and reactive to light.   Cardiovascular:      Rate and Rhythm: Normal rate and regular rhythm.      Heart sounds: Normal heart sounds. No murmur heard.  Pulmonary:      Effort: Pulmonary effort is normal.      Breath sounds: No wheezing or rales.   Chest:   Breasts:     Right: Normal.      Left: Normal.   Abdominal:      General: Bowel sounds are normal.      Palpations: Abdomen is soft.      Tenderness: There is no abdominal tenderness.   Genitourinary:     Vagina: Normal.      Cervix: Normal.   Musculoskeletal:      Cervical back: Normal range of motion and neck supple. No rigidity.   Lymphadenopathy:      Cervical: No cervical adenopathy.   Skin:     General: Skin is warm and dry.      Findings: No rash.   Neurological:      Mental Status: She is alert and oriented to person, place, and time.         ASSESSMENT/PLAN:   Irina was seen today for physical.    Routine general medical " examination at a health care facility  -Vitals: WNL  -Pap: done today  -Contraception: declines  -Immunizations: will return for shots  -Labs: see below  -Exercise: peloton  -Diet: well balanced    Cervical cancer screening  -     Pap Screen with HPV - recommended age 30 - 65 years    Family history of thyroid disease  -     TSH with free T4 reflex; Future  -     TSH with free T4 reflex    Screening for lipid disorders  -     Lipid panel; Future  -     Lipid panel    Screening for diabetes mellitus  -     Hemoglobin A1c; Future  -     Hemoglobin A1c    Menstrual cramp  Pt noticing more severe menstrual cramps. Has had some spotting between periods. Endorses history of painful periods as a teenager, for which she use to be on birth control. No abnormalities or polyps seen on  exam today. Differential includes: fibroids, endometriosis, ovarian cyst. Will get pelvic US and labs below to evaluate. Pt declines contraception for symptom mgmt today.  -     Estradiol; Future  -     Comprehensive metabolic panel; Future  -     CBC with platelets; Future  -     Ferritin; Future  -     Estradiol  -     Comprehensive metabolic panel  -     CBC with platelets  -     Ferritin    Frequent urination  Incomplete bladder emptying  Suspect related to anxiety/stress. Discussed cutting out carbonated drinks, caffeine, alcohol, spicy/acidic foods as these can be bladder irritants. If no improvement in 3 months, consider urology or urogyn referral.      Other orders  -     REVIEW OF HEALTH MAINTENANCE PROTOCOL ORDERS  -     PRIMARY CARE FOLLOW-UP SCHEDULING; Future      COUNSELING:  Reviewed preventive health counseling, as reflected in patient instructions        She reports that she has never smoked. She has never used smokeless tobacco.      Jas Fitch DO  [facility]

## 2023-10-26 LAB
ALBUMIN SERPL BCG-MCNC: 4.5 G/DL (ref 3.5–5.2)
ALP SERPL-CCNC: 58 U/L (ref 35–104)
ALT SERPL W P-5'-P-CCNC: 11 U/L (ref 0–50)
ANION GAP SERPL CALCULATED.3IONS-SCNC: 10 MMOL/L (ref 7–15)
AST SERPL W P-5'-P-CCNC: 21 U/L (ref 0–45)
BILIRUB SERPL-MCNC: 0.3 MG/DL
BUN SERPL-MCNC: 10.3 MG/DL (ref 6–20)
CALCIUM SERPL-MCNC: 9.4 MG/DL (ref 8.6–10)
CHLORIDE SERPL-SCNC: 103 MMOL/L (ref 98–107)
CHOLEST SERPL-MCNC: 192 MG/DL
CREAT SERPL-MCNC: 0.81 MG/DL (ref 0.51–0.95)
DEPRECATED HCO3 PLAS-SCNC: 25 MMOL/L (ref 22–29)
EGFRCR SERPLBLD CKD-EPI 2021: >90 ML/MIN/1.73M2
ESTRADIOL SERPL-MCNC: 83 PG/ML
FERRITIN SERPL-MCNC: 51 NG/ML (ref 6–175)
GLUCOSE SERPL-MCNC: 85 MG/DL (ref 70–99)
HDLC SERPL-MCNC: 77 MG/DL
LDLC SERPL CALC-MCNC: 100 MG/DL
NONHDLC SERPL-MCNC: 115 MG/DL
POTASSIUM SERPL-SCNC: 4.5 MMOL/L (ref 3.4–5.3)
PROT SERPL-MCNC: 7.3 G/DL (ref 6.4–8.3)
SODIUM SERPL-SCNC: 138 MMOL/L (ref 135–145)
TRIGL SERPL-MCNC: 74 MG/DL
TSH SERPL DL<=0.005 MIU/L-ACNC: 2.02 UIU/ML (ref 0.3–4.2)

## 2023-10-30 LAB
BKR LAB AP GYN ADEQUACY: NORMAL
BKR LAB AP GYN INTERPRETATION: NORMAL
BKR LAB AP HPV REFLEX: NORMAL
BKR LAB AP LMP: NORMAL
BKR LAB AP PREVIOUS ABNORMAL: NORMAL
PATH REPORT.COMMENTS IMP SPEC: NORMAL
PATH REPORT.COMMENTS IMP SPEC: NORMAL
PATH REPORT.RELEVANT HX SPEC: NORMAL

## 2023-11-01 LAB
HUMAN PAPILLOMA VIRUS 16 DNA: NEGATIVE
HUMAN PAPILLOMA VIRUS 18 DNA: NEGATIVE
HUMAN PAPILLOMA VIRUS FINAL DIAGNOSIS: NORMAL
HUMAN PAPILLOMA VIRUS OTHER HR: NEGATIVE

## 2023-11-09 ENCOUNTER — HOSPITAL ENCOUNTER (OUTPATIENT)
Dept: CT IMAGING | Facility: HOSPITAL | Age: 38
Discharge: HOME | End: 2023-11-09
Attending: STUDENT IN AN ORGANIZED HEALTH CARE EDUCATION/TRAINING PROGRAM | Admitting: STUDENT IN AN ORGANIZED HEALTH CARE EDUCATION/TRAINING PROGRAM
Payer: COMMERCIAL

## 2023-11-09 ENCOUNTER — TELEPHONE (OUTPATIENT)
Dept: FAMILY MEDICINE | Facility: CLINIC | Age: 38
End: 2023-11-09
Payer: COMMERCIAL

## 2023-11-09 ENCOUNTER — ANCILLARY PROCEDURE (OUTPATIENT)
Dept: ULTRASOUND IMAGING | Facility: CLINIC | Age: 38
End: 2023-11-09
Attending: STUDENT IN AN ORGANIZED HEALTH CARE EDUCATION/TRAINING PROGRAM
Payer: COMMERCIAL

## 2023-11-09 DIAGNOSIS — N92.0 MENSTRUAL SPOTTING: ICD-10-CM

## 2023-11-09 DIAGNOSIS — R19.00 PELVIC MASS: Primary | ICD-10-CM

## 2023-11-09 DIAGNOSIS — R19.00 PELVIC MASS: ICD-10-CM

## 2023-11-09 DIAGNOSIS — N94.6 MENSTRUAL CRAMP: ICD-10-CM

## 2023-11-09 PROCEDURE — 76830 TRANSVAGINAL US NON-OB: CPT

## 2023-11-09 PROCEDURE — 74177 CT ABD & PELVIS W/CONTRAST: CPT

## 2023-11-09 PROCEDURE — 250N000011 HC RX IP 250 OP 636: Performed by: STUDENT IN AN ORGANIZED HEALTH CARE EDUCATION/TRAINING PROGRAM

## 2023-11-09 RX ORDER — IOPAMIDOL 755 MG/ML
90 INJECTION, SOLUTION INTRAVASCULAR ONCE
Status: COMPLETED | OUTPATIENT
Start: 2023-11-09 | End: 2023-11-09

## 2023-11-09 RX ADMIN — IOPAMIDOL 90 ML: 755 INJECTION, SOLUTION INTRAVENOUS at 18:25

## 2023-11-09 NOTE — TELEPHONE ENCOUNTER
Test Results        Who ordered the test:  Dr. Fitch    Type of test: Lab and Ultrasound    Date of test:  11/9/2023    Where was the test performed:  [facility] [place] location    What are your questions/concerns?:  patient would like a call back regarding her results that is recommending her to have a CT or MRI done and she has a up coming appointment with Urology regarding frequent urination should she still keep that appointment or not do to the results stating seeing a mass of some sort and recommending the get a CT scan or MRI done. Should she wait on seeing Urology?    Could we send this information to you in [employer] or would you prefer to receive a phone call?:   Patient would prefer a phone call   Okay to leave a detailed message?: Yes at Home number on file [phone] (home)

## 2023-11-09 NOTE — TELEPHONE ENCOUNTER
Writer called patient and reviewed message per Dr. Fitch, along with Radiology scheduling number.    Patient verbalized understanding and asked questions regarding next steps.    Explained to patient CT scan will hopefully give clearer view of the particular tissue collection noted on ultrasound.  Once CT scan results are back either clinic RN or Dr. Fitch will call patient with results and next steps, which may include a different specialty referral/consult.  Dr. Fitch is timely with reviewing results.    Patient verbalized understanding.    ALEX ReedN, RN-[facility]

## 2023-11-10 DIAGNOSIS — R19.00 PELVIC MASS: Primary | ICD-10-CM

## 2023-11-13 ENCOUNTER — ANCILLARY PROCEDURE (OUTPATIENT)
Dept: MRI IMAGING | Facility: CLINIC | Age: 38
End: 2023-11-13
Attending: STUDENT IN AN ORGANIZED HEALTH CARE EDUCATION/TRAINING PROGRAM
Payer: COMMERCIAL

## 2023-11-13 DIAGNOSIS — R19.00 PELVIC MASS: ICD-10-CM

## 2023-11-13 PROCEDURE — A9585 GADOBUTROL INJECTION: HCPCS | Mod: JZ | Performed by: RADIOLOGY

## 2023-11-13 PROCEDURE — 72197 MRI PELVIS W/O & W/DYE: CPT | Mod: GC | Performed by: RADIOLOGY

## 2023-11-13 RX ORDER — GADOBUTROL 604.72 MG/ML
7.5 INJECTION INTRAVENOUS ONCE
Status: COMPLETED | OUTPATIENT
Start: 2023-11-13 | End: 2023-11-13

## 2023-11-13 RX ADMIN — GADOBUTROL 6.5 ML: 604.72 INJECTION INTRAVENOUS at 10:43

## 2023-11-14 ENCOUNTER — TELEPHONE (OUTPATIENT)
Dept: FAMILY MEDICINE | Facility: CLINIC | Age: 38
End: 2023-11-14

## 2023-11-14 NOTE — TELEPHONE ENCOUNTER
Received call from patient requesting pelvis MRI done yesterday to be read. She has an appointment with urology tomorrow AM and needs this to be read prior to her appointment (if no urology concern, appt is not needed).     She did already call imaging herself and they informed her it could take up to 5 days. She is requesting that care team call as well.     Called [facility], they also stated that it could take up to 5 days to be read is this was entered as a routine order (not STAT or priority). They are unable to expedite this.     Recommended to patient that she keep appointment with urology tomorrow as scheduled as images are available in Epic for urologist to review.

## 2023-11-15 ENCOUNTER — VIRTUAL VISIT (OUTPATIENT)
Dept: UROLOGY | Facility: CLINIC | Age: 38
End: 2023-11-15
Payer: COMMERCIAL

## 2023-11-15 ENCOUNTER — TELEPHONE (OUTPATIENT)
Dept: UROLOGY | Facility: CLINIC | Age: 38
End: 2023-11-15

## 2023-11-15 ENCOUNTER — PATIENT OUTREACH (OUTPATIENT)
Dept: UROLOGY | Facility: CLINIC | Age: 38
End: 2023-11-15

## 2023-11-15 DIAGNOSIS — R35.0 URINARY FREQUENCY: Primary | ICD-10-CM

## 2023-11-15 PROCEDURE — 99203 OFFICE O/P NEW LOW 30 MIN: CPT | Mod: VID | Performed by: STUDENT IN AN ORGANIZED HEALTH CARE EDUCATION/TRAINING PROGRAM

## 2023-11-15 RX ORDER — SOLIFENACIN SUCCINATE 5 MG/1
5 TABLET, FILM COATED ORAL DAILY
Qty: 90 TABLET | Refills: 1 | Status: SHIPPED | OUTPATIENT
Start: 2023-11-15

## 2023-11-15 NOTE — TELEPHONE ENCOUNTER
Writer reached out to pt to offer a sooner UDS appt.     No answer, writer left a generic VM with melyssa back name and number. Writer will also send [employer] message containing more details regarding appt. For 12/27 at 7:00    Will continue to follow as needed.   
,

## 2023-11-15 NOTE — NURSING NOTE
Chief Complaint   Patient presents with    Urgency     per pt/frequent urination, urgency/ virtual verified/ MR in epic/ Virtual send to email/self referred       There were no vitals filed for this visit.  Wt Readings from Last 1 Encounters:   10/25/23 62.1 kg (136 lb 12.8 oz)   Nette Nixon MA

## 2023-11-15 NOTE — PATIENT INSTRUCTIONS
"BLADDER IRRITANTS    Below is a list of foods that can irritate the bladder.     Caffeinated soft drinks  Coffee.  Tea.  Chocolate.  Acidic juices and fruits such as cranberry, lemon, orange, pineapple and other citrus as well as tart apple or cherry   Alcoholic beverages  Carbonated drinks, especially madeleine  Aspartame/Nutrasweet.  Spicy foods  Tomato products    Substitutions that you can make in your daily diet:  - Herbals teas that don't contain caffeine or a large amount of citrus  - Ovaltine instead of chocolate drinks  - Less acidic fruits such as blueberry, banana, sweet (rather than tart) apples, pears, papaya.      Either:  - Consider limiting or avoiding these foods then monitoring the impact on your urinary symptoms   - Or consider an \"elimination diet.\"  Start by eliminating all these foods for 2 weeks.  Assess for progress.  If you are feeling better, slowly add foods back - 1 new food every 3-5 days and assess for worsening.  This way you can pinpoint exactly which foods worsen your bladder symptoms.   "

## 2023-11-15 NOTE — PROGRESS NOTES
Chief Complaint:   Urinary frequency         History of Present Illness:   Irina Marino is a 37 year old female with no significant past medical history who presents for evaluation of urinary frequency.    The patient reports a several year history of urinary frequency and urgency. This has worsened in the last 1-2 years. There will be times she urinates every 30 minutes to one hour. She has had instances of minimal urge incontinence. She reports nocturia x 0-1. She reports sensation of incomplete bladder emptying and occasional reports double voids.     She denies dysuria and gross hematuria.     She reports occasional diarrhea and constipation.     She drinks 1 cup of coffee, water, sparkling water, and herbal tea. She has been trying to reduce sparkling water intake.          Past Medical History:   No past medical history on file.         Past Surgical History:     Past Surgical History:   Procedure Laterality Date    [facility]  2004            Medications     Current Outpatient Medications   Medication    Multiple Vitamins-Iron (MULTIPLE VITAMIN/IRON OR)     No current facility-administered medications for this visit.            Allergies:   Chlorpheniramine-phenylephrine [chlorpheniramine-phenylephrine] and Latex         Review of Systems:  From intake questionnaire   Negative 14 system review except as noted on HPI, nurse's note.         Physical Exam:   Patient is a 37 year old female evaluated via video visit.       Labs and Pathology:    I personally reviewed all applicable laboratory data and went over findings with patient  Significant for:    CBC RESULTS:  Recent Labs   Lab Test 10/25/23  1454 06/28/21  1248 01/06/21  0917   WBC 5.1 6.3 4.8   HGB 13.2 14.2 14.0    244 216        BMP RESULTS:  Recent Labs   Lab Test 10/25/23  1454 06/28/21  1248 01/06/21  0917    137 139   POTASSIUM 4.5 4.1 4.7   CHLORIDE 103 103 105   CO2 25 22 23   ANIONGAP 10 12 11   GLC 85 86 83    BUN 10.3 5* 9   CR 0.81 0.82 0.84   GFRESTIMATED >90 >60 >60   GFRESTBLACK  --  >60 >60   DYLAN 9.4 9.3 9.3       UA RESULTS:   Recent Labs   Lab Test 06/28/21  1320 01/06/21  0817   SG 1.010 1.025   URINEPH 5.5 6.0   NITRITE Negative Negative   RBCU  --  3-5*   WBCU  --  None Seen            Assessment and Plan:     Assessment: 37 year old female seen in evaluation for longstanding urinary frequency and urgency.     We discussed possible causes for urinary frequency and urgency including infection, diabetes, pelvic floor dysfunction, and overactive bladder. We discussed that pelvic floor dysfunction vs overactive bladder would be the most likely causes in her case. We discussed treatment options. We also discussed that urodynamics testing can help distinguish between the two.     At this time, she would like to try pelvic floor PT. She is also interested in trying an anticholinergic. If the side effects are bothersome, she would focus more on PT. She is also interested in scheduling urodynamics testing before the end of the year, if possible, as she has met her deductible.     Plan:  Start solifenacin 5 mg daily. Side effects reviewed.   Pelvic floor PT.   Urodynamics testing to further evaluate symptoms.  Follow up in three months, sooner if concerns.     GERRY FLOWERS PA-C  Department of Urology

## 2023-11-15 NOTE — TELEPHONE ENCOUNTER
[facility] Call Center    Phone Message    May a detailed message be left on voicemail: yes     Reason for Call: Other: pt returning call for procedure, please advise     Action Taken: Other: urology    Travel Screening: Not Applicable

## 2023-12-04 ENCOUNTER — THERAPY VISIT (OUTPATIENT)
Dept: PHYSICAL THERAPY | Facility: CLINIC | Age: 38
End: 2023-12-04
Attending: STUDENT IN AN ORGANIZED HEALTH CARE EDUCATION/TRAINING PROGRAM
Payer: COMMERCIAL

## 2023-12-04 DIAGNOSIS — N39.41 URGENCY INCONTINENCE: ICD-10-CM

## 2023-12-04 DIAGNOSIS — R35.0 URINARY FREQUENCY: ICD-10-CM

## 2023-12-04 DIAGNOSIS — R27.8 MUSCULAR INCOORDINATION: Primary | ICD-10-CM

## 2023-12-04 PROCEDURE — 97110 THERAPEUTIC EXERCISES: CPT | Mod: GP | Performed by: PHYSICAL THERAPIST

## 2023-12-04 PROCEDURE — 97161 PT EVAL LOW COMPLEX 20 MIN: CPT | Mod: GP | Performed by: PHYSICAL THERAPIST

## 2023-12-04 PROCEDURE — 97535 SELF CARE MNGMENT TRAINING: CPT | Mod: GP | Performed by: PHYSICAL THERAPIST

## 2023-12-04 NOTE — PROGRESS NOTES
PHYSICAL THERAPY EVALUATION  Type of Visit: Evaluation    See electronic medical record for Abuse and Falls Screening details.    Subjective       Irina reports that she has been experiencing urinary frequency and urgency for several years. Frequency can be as often as 30 minutes but more often hourly, and has become more bothersome over the past year or two. At times leaking with urgency, but minimally. Also noting some incomplete bladder emptying and occasional double voids. Was prescribed solifenacin but has not yet started.   Presenting condition or subjective complaint:    Date of onset: 11/15/23 (MD order, symptoms ongoing for years)    Relevant medical history:     Dates & types of surgery:      Prior diagnostic imaging/testing results:       Prior therapy history for the same diagnosis, illness or injury:        Prior Level of Function  Transfers:   Ambulation:   ADL:   IADL:     Living Environment  Social support:     Type of home:     Stairs to enter the home:         Ramp:     Stairs inside the home:         Help at home:    Equipment owned:       Employment:      Hobbies/Interests:      Patient goals for therapy:      Pain assessment: Pain denied     Objective      PELVIC EVALUATION  ADDITIONAL HISTORY:  Sex assigned at birth: Female  Gender identity: Female    Pronouns: She/Her Hers      Bladder History:  Feels bladder filling: Yes  Triggers for feeling of inability to wait to go to the bathroom: Yes unavailability of restroom  How long can you wait to urinate:    Gets up at night to urinate: No    Can stop the flow of urine when urinating: Yes  Volume of urine usually released: Medium   Other issues: Straining to pass urine; Trouble emptying bladder completely; Dribbling after urinating  Number of bladder infections in last 12 months: 0  Fluid intake per day: 6-8 glasses water 6-8 oz caffiene    Medications taken for bladder: No     Activities causing urine leak: Hurrying to the bathroom due to a  strong urge to urinate (pee)    Amount of urine typically leaked: drops  Pads used to help with leaking: No        Bowel History:  Frequency of bowel movement: daily/ 2x/daily. Jackson stool scale, reports typically type 4  Consistency of stool:   Hard/soft/soft-formed  Ignores the urge to defecate: Sometimes  Other bowel issues: Pain when pooping  Length of time spent trying to have a bowel movement:      Sexual Function History:  Sexual orientation: Straight    Sexually active: -- (Not currently sexually active)  Lubrication used:      Pelvic pain:   Reports no pain with pelvic exam or use of tampon. Historically may have had  pain with penetration  Pain or difficulty with orgasms/erection/ejaculation:      State of menopause: Perimenopause (have not gone through menopause yet)  Hormone medications: No      Are you currently pregnant: No, Number of previous pregnancies: 0, Have you been diagnosed with pelvic prolapse or abdominal separation: No, Do you get regular exercise: Yes, I do this type of exercise: Yoga, weights, bike, Have you tried pelvic floor strengthening exercises for 4 weeks: No, Do you have any history of trauma that is relevant to your care that you d like to share: No    Discussed reason for referral regarding pelvic health needs and external/internal pelvic floor muscle examination with patient/guardian.  Opportunity provided to ask questions and verbal consent for assessment and intervention was given.    PAIN: Pain Level at Rest: 0/10 Denies  POSTURE:   LUMBAR SCREEN: AROM WNL  HIP SCREEN:  Strength:    L psaos TTP  Functional Strength Testing:     PELVIC/SI SCREEN:  WNL   PAIN PROVOCATION TEST: WNL  PELVIS/SI SPECIAL TESTS:   BREATHING SYMMETRY:     PELVIC EXAM  External Visual Inspection:  At rest: Normal  With voluntary pelvic floor contraction: Perineal elevation  Relaxation of PFM: Partial/delayed relaxation  With intra-abdominal pressure: Valsalva: Perineal descent    Integumentary:    Introitus: Unremarkable    External Digital Palpation per Perineum:   Ischiocavernosis: Unremarkable  Bulbo cavernosis: Unremarkable  Transverse perineal: Unremarkable  Levator ani: Unremarkable  Perineal body: Unremarkable    Scar:   Location/Type: na  Mobility:     Internal Digital Palpation:  Per Vagina:  TTP L periurethral  R levator,  mild periurethral  Tenderness  Digital Muscle Performance: P (Power): 3  Relaxation Post-Contraction: Partial/delayed relaxation  Endurance: 5+seconds, held further completion PERF secondary to delayed relaxation    Per Rectum:        Pelvic Organ Prolapse:       ABDOMINAL ASSESSMENT  Diastasis Rectus Abdominis (KIESHA):      Abdominal Activation/Strength: SLR: WFL    Scar:   Location/Type: na  Mobility:     Fascial Tension/Restriction/Tone:     BIOFEEDBACK:  Position:   Surface Electrodes:     Abdominals:     Perianals:       DERMATOMES:   DTR S:     Assessment & Plan   CLINICAL IMPRESSIONS  Medical Diagnosis: Urinary frequency (R35.0)    Treatment Diagnosis: Urge incontinence, muscle incoordination   Impression/Assessment: Patient is a 37 year old female with urinary frequency and urgency complaints.  The following significant findings have been identified: Decreased ROM/flexibility, Decreased proprioception, Impaired muscle performance, and Decreased activity tolerance. These impairments interfere with their ability to perform self care tasks, work tasks, recreational activities, household mobility, and community mobility as compared to previous level of function.     Clinical Decision Making (Complexity):  Clinical Presentation: Stable/Uncomplicated  Clinical Presentation Rationale: based on medical and personal factors listed in PT evaluation  Clinical Decision Making (Complexity): Low complexity    PLAN OF CARE  Treatment Interventions:  Modalities: Biofeedback  Interventions: Manual Therapy, Neuromuscular Re-education, Therapeutic Activity, Therapeutic Exercise,  Self-Care/Home Management    Long Term Goals     PT Goal 1  Goal Identifier: 1  Goal Description: Pt will increase voiding intervals to 2.5 hours  Rationale: to maximize safety and independence with performance of ADLs and functional tasks;to maximize safety and independence within the home;to maximize safety and independence within the community  Target Date: 01/29/24  PT Goal 2  Goal Identifier: 2  Goal Description: Pt will report no leakage with urgency  Rationale: to maximize safety and independence with performance of ADLs and functional tasks;to maximize safety and independence within the home;to maximize safety and independence within the community  Target Date: 01/29/24      Frequency of Treatment: 1x/week  Duration of Treatment: decreasing to 1x/2 weeks for 8 visits    Recommended Referrals to Other Professionals:   Education Assessment:        Risks and benefits of evaluation/treatment have been explained.   Patient/Family/caregiver agrees with Plan of Care.     Evaluation Time:     PT Eval, Low Complexity Minutes ([id number]): 40       Signing Clinician: Gini Pineda PT

## 2023-12-11 ENCOUNTER — THERAPY VISIT (OUTPATIENT)
Dept: PHYSICAL THERAPY | Facility: CLINIC | Age: 38
End: 2023-12-11
Attending: STUDENT IN AN ORGANIZED HEALTH CARE EDUCATION/TRAINING PROGRAM
Payer: COMMERCIAL

## 2023-12-11 DIAGNOSIS — R27.8 MUSCULAR INCOORDINATION: Primary | ICD-10-CM

## 2023-12-11 DIAGNOSIS — N39.41 URGENCY INCONTINENCE: ICD-10-CM

## 2023-12-11 PROCEDURE — 97530 THERAPEUTIC ACTIVITIES: CPT | Mod: GP

## 2023-12-11 PROCEDURE — 97535 SELF CARE MNGMENT TRAINING: CPT | Mod: GP

## 2023-12-11 PROCEDURE — 90912 BFB TRAINING 1ST 15 MIN: CPT | Mod: GP

## 2023-12-11 NOTE — PROGRESS NOTES
After working on post contraction relaxation, 1 contraction at 245sec, and 2 in a row at 260sec.   Baseline had decreased from 15mV to ~7mV        Baseline after doing diaphragmatic breathing, still erratic but down to 2-5mV

## 2023-12-12 ENCOUNTER — PRE VISIT (OUTPATIENT)
Dept: UROLOGY | Facility: CLINIC | Age: 38
End: 2023-12-12
Payer: COMMERCIAL

## 2023-12-27 ENCOUNTER — ALLIED HEALTH/NURSE VISIT (OUTPATIENT)
Dept: UROLOGY | Facility: CLINIC | Age: 38
End: 2023-12-27
Payer: COMMERCIAL

## 2023-12-27 VITALS
DIASTOLIC BLOOD PRESSURE: 76 MMHG | HEART RATE: 74 BPM | BODY MASS INDEX: 21.86 KG/M2 | SYSTOLIC BLOOD PRESSURE: 110 MMHG | WEIGHT: 136 LBS | HEIGHT: 66 IN

## 2023-12-27 DIAGNOSIS — R35.0 URINARY FREQUENCY: Primary | ICD-10-CM

## 2023-12-27 LAB
ALBUMIN UR-MCNC: NEGATIVE MG/DL
APPEARANCE UR: CLEAR
BILIRUB UR QL STRIP: NEGATIVE
COLOR UR AUTO: YELLOW
GLUCOSE UR STRIP-MCNC: NEGATIVE MG/DL
HGB UR QL STRIP: NEGATIVE
KETONES UR STRIP-MCNC: NEGATIVE MG/DL
LEUKOCYTE ESTERASE UR QL STRIP: NEGATIVE
NITRATE UR QL: NEGATIVE
PH UR STRIP: 6.5 [PH] (ref 5–8)
SP GR UR STRIP: 1.01 (ref 1–1.03)
UROBILINOGEN UR STRIP-ACNC: 0.2 E.U./DL

## 2023-12-27 PROCEDURE — 51797 INTRAABDOMINAL PRESSURE TEST: CPT | Performed by: PHYSICIAN ASSISTANT

## 2023-12-27 PROCEDURE — 81003 URINALYSIS AUTO W/O SCOPE: CPT | Performed by: PATHOLOGY

## 2023-12-27 PROCEDURE — 51784 ANAL/URINARY MUSCLE STUDY: CPT | Performed by: PHYSICIAN ASSISTANT

## 2023-12-27 PROCEDURE — 51728 CYSTOMETROGRAM W/VP: CPT | Performed by: PHYSICIAN ASSISTANT

## 2023-12-27 PROCEDURE — 51741 ELECTRO-UROFLOWMETRY FIRST: CPT | Performed by: PHYSICIAN ASSISTANT

## 2023-12-27 ASSESSMENT — PAIN SCALES - GENERAL: PAINLEVEL: NO PAIN (0)

## 2023-12-27 NOTE — PATIENT INSTRUCTIONS
UROLOGY CLINIC VISIT PATIENT INSTRUCTIONS    Schedule a follow up virtual visit with Hiral Hernandez PA-C next available to discuss next steps.     If you have any issues, questions or concerns in the meantime, do not hesitate to contact us at [phone] or via "[employer]".     It was a pleasure meeting with you today.  Thank you for allowing me and my team the privilege of caring for you today.  YOU are the reason we are here, and I truly hope we provided you with the excellent service you deserve.  Please let us know if there is anything else we can do for you so that we can be sure you are leaving completely satisfied with your care experience.

## 2023-12-27 NOTE — NURSING NOTE
"  Chief Complaint   Patient presents with    Urodynamics Study     Urinary frequency  Urinary urgency       Blood pressure 110/76, pulse 74, height 1.676 m (5' 6\"), weight 61.7 kg (136 lb). Body mass index is 21.95 kg/m .    Patient Active Problem List   Diagnosis    H/O LEEP    Pes planus, flexible    Seasonal allergies    Subtalar joint instability    Muscular incoordination    Urgency incontinence       Allergies   Allergen Reactions    Chlorpheniramine-Phenylephrine [Chlorpheniramine-Phenylephrine] Palpitations     Sudafed    Latex Rash       Current Outpatient Medications   Medication Sig Dispense Refill    solifenacin (VESICARE) 5 MG tablet Take 1 tablet (5 mg) by mouth daily 90 tablet 1    Multiple Vitamins-Iron (MULTIPLE VITAMIN/IRON OR) None Entered (Patient not taking: Reported on 2023)         Social History     Tobacco Use    Smoking status: Never    Smokeless tobacco: Never    Tobacco comments:     no passive exposure   Vaping Use    Vaping Use: Never used   Substance Use Topics    Alcohol use: Yes     Alcohol/week: 10.0 standard drinks of alcohol     Types: 10 Standard drinks or equivalent per week     Comment: 4-5 a week    Drug use: Never       Invasive Procedure Safety Checklist:    Procedure: Urodynamics    Action: Complete sections and checkboxes as appropriate.    Pre-procedure:    1. Patient ID Verified with 2 identifiers (Corazon and  or MRN) : YES    2. Procedure and site verified with patient/designee (when able) : YES    3. Accurate consent documentation in medical record : YES    4. H&P (or appropriate assessment) documented in medical record : N/A    H&P must be up to 30 days prior to procedure an updated within 24 hours of Procedure as applicable.     5. Relevant diagnostic and radiology test results appropriately labeled and displayed as applicable : YES    6. Blood products, implants, devices, and/or special equipment available for the procedure as applicable : YES    7. Procedure " site(s) marked with provider initials [Exclusions: none] : NO    8. Marking not required. Reason : Yes  Procedure does not require site marking    Time Out:     Time-Out performed immediately prior to starting procedure, including verbal and active participation of all team members addressing: YES    1. Correct patient identity.  2. Confirmed that the correct side and site are marked.  3. An accurate procedure to be done.  4. Agreement on the procedure to be done.  5. Correct patient position.  6. Relevant images and results are properly labeled and appropriately displayed.  7. The need to administer antibiotics or fluids for irrigation purposes during the procedure as applicable.  8. Safety precautions based on patient history or medication use.    During Procedure: Verification of correct person, site, and procedure occurs any time the responsibility for care of the patient is transferred to another member of the care team.        Magda Fernando CMA  12/27/2023  7:18 AM

## 2023-12-27 NOTE — PROGRESS NOTES
PREPROCEDURE DIAGNOSES:    1. Urinary frequency, urgency    POSTPROCEDURE DIAGNOSES:  -Maximum cystometric capacity 433 mL with heightened filling sensations.  -Good bladder compliance without detrusor overactivity or stress incontinence.  -Maximum detrusor contraction during voiding reaches 59 cm H2O, which she augments with Valsalva efforts. She voids 433 mL with Qmax 25 ml/s, continuous flow curve, increased EMG activity, complete bladder emptying (PVR 0 mL), and no evidence for bladder outlet obstruction (BOOI -12.4).    PROCEDURE:    1. Uroflowmetry.  2. Sterile urethral catheterization for measurement of postvoid residual urine volume.  3. Complex filling cystometrogram with measurement of bladder and rectal pressures.  4. Complex voiding cystometrogram with measurement of bladder and rectal pressures.  5. Electromyography of the pelvic floor during urodynamics.    INDICATIONS FOR PROCEDURE:  Ms. Irina Marino is a pleasant 38 year old female with urinary frequency and urgency. Baseline urodynamic assessment is requested today by Hiral Hernandez PA-C to better characterize Ms. Irina Marino's voiding dysfunction.      VOIDING DIARY:  Did not complete.    DESCRIPTION OF PROCEDURE:  Risks, benefits, and alternatives to urodynamics were discussed with the patient and she wished to proceed.  Urodynamics are planned to better assess the primary etiology for Ms. Marino's urologic dysfunction.  The patient does not currently take anticholinergic or beta 3 agonist medications for her bladder.  After informed consent was obtained, the patient was taken to the procedure room where uroflowmetry was performed. Findings below.     PRE-STUDY UROFLOWMETRY:  Voided volume: 30 mL.  Maximum flow rate: 10.9 mL/sec.  Average flow rate: 5.5 mL/sec.  Character of the curve: voided volume insufficient to reliably interpret.  Postvoid residual by catheter: 20 mL.  Pretest urine dipstick was negative for leukocytes and  nitrites.    Next a 7F double-lumen urodynamics catheter was inserted into the bladder under sterile technique via urethra.  A 7F abdominal manometry catheter was placed in the vagina.  EMG pads were placed on both sides of the anal verge.  The bladder was filled with normal saline at 40 mL/minute and serial pressures were recorded.  With coughing there was an appropriate rise in vesical and abdominal pressures with no change in detrusor pressure, confirming good study catheter placement.    DURING THE FILLING PHASE:  First sensation: 56 mL.  First Desire: 105 mL.  Strong Desire: 192 mL.  Maximum Capacity: filled to 315 mL; true [facility] 433 mL based on final voided volume + PVR.    Uninhibited detrusor contractions: none.  Compliance: good. PDet=5.6 cmH20 at capacity. Compliance ratio of 77.  Continence: no DOI or VENKAT.  EMG: mostly concordant during filling.    DURING THE VOIDING PHASE:  Maximum detrusor contraction with void: 59 cm of H2O pressure, which she augments with Valsalva efforts.  Voided volume: 433 mL.  Maximum flow rate: 25 mL/sec.  Average flow rate: 8.3 mL/sec.  Postvoid Residual: 0 mL.  EMG activity: increased.  Character of voiding curve: continuous.  BOOI: -12.4 (suggesting no obstruction - see key below)  [obstructed (HUMPHREY index [BOOI] ? 40); equivocal (no definite   obstruction; BOOI 20-40); and no obstruction (BOOI ? 20)]    ASSESSMENT/PLAN:  Ms. Irina Marino is a pleasant 38 year old female with urinary frequency/urgency who demonstrated the following findings today on urodynamic evaluation:    -Maximum cystometric capacity 433 mL with heightened filling sensations.  -Good bladder compliance without detrusor overactivity or stress incontinence.  -Maximum detrusor contraction during voiding reaches 59 cm H2O, which she augments with Valsalva efforts. She voids 433 mL with Qmax 25 ml/s, continuous flow curve, increased EMG activity, complete bladder emptying (PVR 0 mL), and no evidence for  bladder outlet obstruction (BOOI -12.4).    The patient will follow up as scheduled with Hiral Hernandez PA-C to further discuss today's study results and make plans for how best to proceed.      - Given presumed normal genitourinary anatomy without other identifiable risk factors, antibiotic prophylaxis was not administered today per department protocol.  The risk of UTI with VUDS is low at ~2.5-3%.      Thank you for allowing me to participate in the care of Ms. Irina Marino and please don't hesitate to contact me with any questions or concerns.      Flori Scanlon PA-C  Urology Physician Assistant

## 2024-01-02 ENCOUNTER — VIRTUAL VISIT (OUTPATIENT)
Dept: UROLOGY | Facility: CLINIC | Age: 39
End: 2024-01-02
Payer: COMMERCIAL

## 2024-01-02 DIAGNOSIS — R35.0 URINARY FREQUENCY: Primary | ICD-10-CM

## 2024-01-02 PROCEDURE — 99213 OFFICE O/P EST LOW 20 MIN: CPT | Mod: 95 | Performed by: STUDENT IN AN ORGANIZED HEALTH CARE EDUCATION/TRAINING PROGRAM

## 2024-01-02 RX ORDER — BIOTIN 10000 MCG
CAPSULE ORAL
COMMUNITY

## 2024-01-02 NOTE — PROGRESS NOTES
Irina Marino is a 38 year old year old who is being evaluated via a billable video visit.      How would you like to obtain your AVS? Z Planehart  If the video visit is dropped, the invitation should be resent by: Text to cell phone: [phone]  Will anyone else be joining your video visit? No    Video-Visit Details    Type of service:  Video Visit     Originating Location (pt. Location): Home    Distant Location (provider location):  On-site  Platform used for Video Visit: [employer]

## 2024-01-02 NOTE — PROGRESS NOTES
UROLOGY FOLLOW-UP NOTE          Chief Complaint:   Today I had the pleasure of seeing Ms. Irina Marino in follow-up for a chief complaint of urinary frequency.          Interval Update   Irina Marino is a very pleasant 38 year old female with no significant past medical history.     Brief History: Ms. Irina Marino was last seen on 11/15/2023 for urinary frequency and urgency with occasional incontinence. She elected for pelvic floor PT and a trial of solifenacin.     She also had urodynamics testing on 12/27/2023, which was fairly unremarkable.     Today's notes: She is doing well today. She has not tried solifenacin yet.          Physical Exam:   Patient is a 38 year old female evaluated via video visit.       Labs and Pathology:    I personally reviewed all applicable laboratory data and went over findings with patient  Significant for:    CBC RESULTS:  Recent Labs   Lab Test 10/25/23  1454 06/28/21  1248 01/06/21  0917   WBC 5.1 6.3 4.8   HGB 13.2 14.2 14.0    244 216        BMP RESULTS:  Recent Labs   Lab Test 10/25/23  1454 06/28/21  1248 01/06/21  0917    137 139   POTASSIUM 4.5 4.1 4.7   CHLORIDE 103 103 105   CO2 25 22 23   ANIONGAP 10 12 11   GLC 85 86 83   BUN 10.3 5* 9   CR 0.81 0.82 0.84   GFRESTIMATED >90 >60 >60   GFRESTBLACK  --  >60 >60   DYLAN 9.4 9.3 9.3       UA RESULTS:   Recent Labs   Lab Test 12/27/23  0731 06/28/21  1320 01/06/21  0817   SG 1.010 1.010 1.025   URINEPH 6.5 5.5 6.0   NITRITE Negative Negative Negative   RBCU  --   --  3-5*   WBCU  --   --  None Seen            Assessment/Plan   38 year old female seen in follow up for urinary frequency and urgency.  She elected for pelvic floor PT and a trial of solifenacin.     She also had urodynamics testing on 12/27/2023, which was fairly unremarkable. She has not tried solifenacin yet. Pelvic floor physical therapy showed a tight pelvic floor.     She is going to trial solifenacin. She will update me on her  "symptoms in about six weeks.    Plan:  Start solifenacin 5 mg daily.                Past Medical History:   No past medical history on file.         Past Surgical History:     Past Surgical History:   Procedure Laterality Date    DARIUS MCKEON  2004            Medications     Current Outpatient Medications   Medication    Multiple Vitamins-Iron (MULTIPLE VITAMIN/IRON OR)    solifenacin (VESICARE) 5 MG tablet     No current facility-administered medications for this visit.            Family History:     Family History   Problem Relation Age of Onset    Allergies Mother         fall pollens/ lactose intolerant    Cancer Maternal Grandfather         testicular cancer    Other - See Comments Mother         gluten intolerance    Skin Cancer Father     Prostate Cancer Father     Diabetes Maternal Grandmother     Dementia Paternal Grandfather     Alzheimer Disease Paternal Great-Grandmother     No Known Problems Maternal Aunt         celiac    No Known Problems Maternal Cousin         celiac            Social History:     Social History     Socioeconomic History    Marital status: Single     Spouse name: Not on file    Number of children: 0    Years of education: Not on file    Highest education level: Not on file   Occupational History    Not on file   Tobacco Use    Smoking status: Never    Smokeless tobacco: Never    Tobacco comments:     no passive exposure   Vaping Use    Vaping Use: Never used   Substance and Sexual Activity    Alcohol use: Yes     Alcohol/week: 10.0 standard drinks of alcohol     Types: 10 Standard drinks or equivalent per week     Comment: 4-5 a week    Drug use: Never    Sexual activity: Not Currently     Partners: Male     Birth control/protection: Pill     Comment: patch; sexual partners more than 5   Other Topics Concern    Not on file   Social History Narrative    1/2021    Lives alone in a house; with one dog    Works as a     Sikhism: [employer]ist, attends Zoroastrianism virtually \"with\" " brother in [place], [place]     Social Determinants of Health     Financial Resource Strain: Low Risk  (10/25/2023)    Financial Resource Strain     Within the past 12 months, have you or your family members you live with been unable to get utilities (heat, electricity) when it was really needed?: No   Food Insecurity: Low Risk  (10/25/2023)    Food Insecurity     Within the past 12 months, did you worry that your food would run out before you got money to buy more?: No     Within the past 12 months, did the food you bought just not last and you didn t have money to get more?: No   Transportation Needs: Low Risk  (10/25/2023)    Transportation Needs     Within the past 12 months, has lack of transportation kept you from medical appointments, getting your medicines, non-medical meetings or appointments, work, or from getting things that you need?: No   Physical Activity: Not on file   Stress: Not on file   Social Connections: Not on file   Interpersonal Safety: Low Risk  (10/25/2023)    Interpersonal Safety     Do you feel physically and emotionally safe where you currently live?: Yes     Within the past 12 months, have you been hit, slapped, kicked or otherwise physically hurt by someone?: No     Within the past 12 months, have you been humiliated or emotionally abused in other ways by your partner or ex-partner?: No   Housing Stability: Low Risk  (10/25/2023)    Housing Stability     Do you have housing? : Yes     Are you worried about losing your housing?: No            Allergies:   Chlorpheniramine-phenylephrine [chlorpheniramine-phenylephrine] and Latex         Review of Systems:  From intake questionnaire   Negative 14 system review except as noted on HPI, nurse's note.        GERRY FLOWERS PA-C  Department of Urology

## 2024-03-22 PROBLEM — R27.8 MUSCULAR INCOORDINATION: Status: RESOLVED | Noted: 2023-12-04 | Resolved: 2024-03-22

## 2024-03-22 PROBLEM — N39.41 URGENCY INCONTINENCE: Status: RESOLVED | Noted: 2023-12-04 | Resolved: 2024-03-22

## 2024-03-22 NOTE — PROGRESS NOTES
12/11/23 0500   Appointment Info   Signing clinician's name / credentials Rossi aPrdo, PT DPT   Total/Authorized Visits 8   Visits Used 2   Medical Diagnosis Urinary frequency (R35.0)   PT Tx Diagnosis Urge incontinence, muscle incoordination   Quick Adds Pelvic Consent   Progress Note/Certification   Onset of illness/injury or Date of Surgery 11/15/23  (MD order, symptoms ongoing for years)   Therapy Frequency 1x/week   Predicted Duration decreasing to 1x/2 weeks for 8 visits   PT Goal 1   Goal Identifier 1   Goal Description Pt will increase voiding intervals to 2.5 hours   Rationale to maximize safety and independence with performance of ADLs and functional tasks;to maximize safety and independence within the home;to maximize safety and independence within the community   Target Date 01/29/24   PT Goal 2   Goal Identifier 2   Goal Description Pt will report no leakage with urgency   Rationale to maximize safety and independence with performance of ADLs and functional tasks;to maximize safety and independence within the home;to maximize safety and independence within the community   Target Date 01/29/24   Subjective Report   Subjective Report Pt reports she has been doing her exercises and has been going to yoga this week. Pt reports she has been making sure to drink water before coffee in morning and hasn't been having as much citrus. Trying not to JIC. Pt reports when she worked from home it was easier to try to gauge water intake and when she needs to go to the bathroom. Pt reports its more difficult when she is out and about. Pt reports she also notice wine was an irritant.   PT Modalities   PT Modalities Biofeedback PFM   Biofeedback   Pelvic Floor Muscles (PFM) Biofeedback Minutes Timed (97928) 15   Patient Response/Progress pt able to decrease pevic floor baseline from 15mV to 2-5mV, slowly improving post contraction relaxation   Treatment Detail working on pelvic floor downtraining, relaxation via  deep breathing improved tone, also working on post contraction relaxation   Treatment Interventions (PT)   Interventions Therapeutic Activity;Self Care/Home Management   Therapeutic Activity   Therapeutic Activities: dynamic activities to improve functional performance minutes (51508) 10   Therapeutic Activities Ther Act 2;Ther Act 3;Ther Act 4   Skilled Intervention education on urge suppression and how pelvic floor tone can affect pelvic floor function   Patient Response/Progress pt verbalized and demonstrated understanding   Ther Act 2 Urge Suppression Techniques   Ther Act 2 - Details reviewed urge suppression techniques and how relaxation and distraction can help, Understanding urges, Patient education about what urges are and their nature. Recognizing triggers that lead to unwanted behaviors. Reducing stress and anxiety through relaxation techniques such as deep breathing. Distracting attention towards another engaging activity to reduce urge.   PTRx Ther Act 2 Diaphragmatic Breathing   PTRx Ther Act 2 - Details to help with pelvic floor relaxation   Self Care/home Management   ADL/Home Mgmt Training (96815) 15   Skilled Intervention Education in bladder function, muscle function, patient consent, and self care techniques   Patient Response/Progress pt verbalized understanding   Self Care 2 bladder diary   Self Care 2 - Details pt given bladder diary and educated on the purpose. pt to fill out voiding frequency, urgency, fluid and food intake, leakage events and amount. To be reviewed at next visit to determine behavioral modifications needed   Self Care 3 General bladder education   Self Care 3 - Details used photos/model, education provided on normal voiding patterns within 2-4 hour cycle including muscle functions and synergies and how we can use these to our advantage to reduce urinary frequency   Self Care Self Care 2;Self Care 3;Self Care 4   Education   Learner/Method Patient   Plan   Home program  printed PTRX   Plan for next session review bladder diary, how is pelvic floor relaxation and urge suppression going   Comments   Comments pt arrived 10 mins late   Total Session Time   Timed Code Treatment Minutes 40   Total Treatment Time (sum of timed and untimed services) 40           DISCHARGE  Reason for Discharge: Patient has failed to schedule further appointments.    Equipment Issued: HEP    Discharge Plan: Patient to continue home program.    Referring Provider:  Hiral Hernandez

## 2024-09-25 ENCOUNTER — PATIENT OUTREACH (OUTPATIENT)
Dept: CARE COORDINATION | Facility: CLINIC | Age: 39
End: 2024-09-25
Payer: COMMERCIAL

## 2024-10-09 ENCOUNTER — PATIENT OUTREACH (OUTPATIENT)
Dept: CARE COORDINATION | Facility: CLINIC | Age: 39
End: 2024-10-09
Payer: COMMERCIAL

## 2024-12-22 ENCOUNTER — HEALTH MAINTENANCE LETTER (OUTPATIENT)
Age: 39
End: 2024-12-22

## 2025-07-31 ENCOUNTER — TRANSFERRED RECORDS (OUTPATIENT)
Dept: HEALTH INFORMATION MANAGEMENT | Facility: CLINIC | Age: 40
End: 2025-07-31
Payer: COMMERCIAL